# Patient Record
Sex: FEMALE | Race: WHITE | NOT HISPANIC OR LATINO | Employment: FULL TIME | URBAN - METROPOLITAN AREA
[De-identification: names, ages, dates, MRNs, and addresses within clinical notes are randomized per-mention and may not be internally consistent; named-entity substitution may affect disease eponyms.]

---

## 2017-02-09 ENCOUNTER — TELEPHONE (OUTPATIENT)
Dept: ORTHOPEDIC SURGERY | Facility: CLINIC | Age: 57
End: 2017-02-09

## 2017-03-02 NOTE — PROGRESS NOTES
Pre-Operative Orthopedic Assessment      Patient: Holly Bateman    YOB: 1960      Age/Gender: 56 y.o. female  Medical Record Number: [id number]  Surgical Procedure Planned: AK TOTAL KNEE ARTHROPLASTY [92913] (LT TOTAL KNEE ARTHROPLASTY)     Surgeon: Myron Easley MD    Date of Surgery Planned: 03/14/2017    Question Value Score    Patient Score   1. What is your age group? 50-65 years  66-75 years  >75 years =2  =1  =0 2   2. Gender? Male  Female =2  =1 1   3. How far on average can you walk? (a block is 200 meters) Two blocks or more (+\- rest)  1-2 blocks (+\- rest)  Housebound (most of time) =2  =1  =0 2   4. Which gait aid to you use? (more often than not) None  Single-Point Stick  Crutches/Frame =2  =1  =0 2   5. Do you use community  supports? (home help, meals on wheels, district nursing) None or one per week  Two or more per week =1  =0 1   6. Will you live with someone who can care for you after your operation? Yes  No =3  =0 0      Your Score (out of 12)  8     Key Destination at Discharge from acute care predicted by score   Scores < 6  -- extended inpatient rehabilitation   Scores 6-9 -- additional intervention to discharge directly home (Rehabilitation in home)   Scores > 9 -- directly home         Discharge Disposition/Planning:     Patient Response   Discussed the Predicted discharge disposition needed based on RAPT Assessment with the patient.    yes   Patient selected discharge disposition:   rehab   Out Patient Rehabilitation Facility of Choice:    none   Home Health Services Preferred:   none   Has the patient completed or been scheduled to complete pre-operative testing? yes   Post-Operative Care Giver Name and Phone Number:    Emergency contact mother  Sandee Bacon  [phone]     Subacute Inpatient Rehabilitation:  Complete this section only if planning inpatient services at a Subacute Facility     Patient Response   Subacute Facility Preferred (Please list  2 facilities:   Touring facilities   Requires pre-certification for inpatient rehabilitation services?      yes   Planned source of transportation to inpatient rehabilitation facility?   Family/friend    If choosing inpatient services at an Acute or Subacute Facility please list a subsequent back-up plan (in case patient fails to qualify for inpatient rehabilitation). Back-up plans should include caregiver (family member or friend) for first 24-48 post- -operatively.    Discussed need for backup of home with HH and caregiver   Home Equipment Patient Response   Does patient have a walker for home use?    yes   Does patient have a 3 in 1 commode for home use?    no   Does patient have a shower chair for home use?    yes   Does patient have an elevated commode seat for home use? Chair height   Does patient have a cane for home use?    yes   Is there any other medical equipment in the home? If so,  List in comment section below no   Pre-Operative Class Attendance Patient Response   Attended or scheduled to attend the pre-operative class within 1 year of total joint replacement? yes   Not planning to attend the pre-operative class    n/a   Has attended the pre-operative class > 1 year ago    n/a   Does not want to attend the class    n/a   Was misinformed that did not need to attend the class    n/a   Class time is not convenient    n/a   Other reasons for refusing to attend the pre-operative class (if yes, see comments below)   n/a   Patient Education  Completed   Expected time of discharge discussed yes   Encouraged to attend Pre-Operative Class    yes   Education re: Back-up plan for patients planning discharge to subacute facilities yes   Education re: Predicted Discharge Disposition based on RAPT score    yes   Patient receptive and voiced understanding of information given    yes                                                                                                            Comments:    Address verified.   Patient resides alone 2 steps to enter home. Patient is scheduled for PAT and class but has a conflict and will call to reschedule.  Patient wants rehab as she resides alone and family works.  Patient is looking for a facility that she wants that takes her [employer].  Patient aware that she needs backup plan in event that [employer] denies rehab stay.                                       Signature: Catrina Smith RN    Date:  3/2/2017

## 2017-03-08 ENCOUNTER — HOSPITAL ENCOUNTER (OUTPATIENT)
Dept: GENERAL RADIOLOGY | Facility: HOSPITAL | Age: 57
Discharge: HOME | End: 2017-03-08
Admitting: ORTHOPAEDIC SURGERY

## 2017-03-08 ENCOUNTER — HOSPITAL ENCOUNTER (OUTPATIENT)
Dept: GENERAL RADIOLOGY | Facility: HOSPITAL | Age: 57
Discharge: HOME | End: 2017-03-08

## 2017-03-08 ENCOUNTER — APPOINTMENT (OUTPATIENT)
Dept: PREADMISSION TESTING | Facility: HOSPITAL | Age: 57
End: 2017-03-08

## 2017-03-08 VITALS
BODY MASS INDEX: 29.88 KG/M2 | TEMPERATURE: 98.4 F | HEIGHT: 64 IN | WEIGHT: 175 LBS | SYSTOLIC BLOOD PRESSURE: 170 MMHG | OXYGEN SATURATION: 99 % | DIASTOLIC BLOOD PRESSURE: 115 MMHG | RESPIRATION RATE: 20 BRPM | HEART RATE: 76 BPM

## 2017-03-08 DIAGNOSIS — G89.29 CHRONIC PAIN OF LEFT KNEE: Primary | ICD-10-CM

## 2017-03-08 DIAGNOSIS — M25.562 CHRONIC PAIN OF LEFT KNEE: Primary | ICD-10-CM

## 2017-03-08 LAB
ABO GROUP BLD: NORMAL
ALBUMIN SERPL-MCNC: 4.3 G/DL (ref 3.5–5.2)
ALBUMIN/GLOB SERPL: 1.9 G/DL
ALP SERPL-CCNC: 49 U/L (ref 39–117)
ALT SERPL W P-5'-P-CCNC: 17 U/L (ref 1–33)
ANION GAP SERPL CALCULATED.3IONS-SCNC: 13.5 MMOL/L
AST SERPL-CCNC: 17 U/L (ref 1–32)
BILIRUB SERPL-MCNC: 0.4 MG/DL (ref 0.1–1.2)
BILIRUB UR QL STRIP: NEGATIVE
BLD GP AB SCN SERPL QL: NEGATIVE
BUN BLD-MCNC: 14 MG/DL (ref 6–20)
BUN/CREAT SERPL: 26.4 (ref 7–25)
CALCIUM SPEC-SCNC: 9.5 MG/DL (ref 8.6–10.5)
CHLORIDE SERPL-SCNC: 105 MMOL/L (ref 98–107)
CLARITY UR: CLEAR
CO2 SERPL-SCNC: 24.5 MMOL/L (ref 22–29)
COLOR UR: YELLOW
CREAT BLD-MCNC: 0.53 MG/DL (ref 0.57–1)
DEPRECATED RDW RBC AUTO: 42.3 FL (ref 37–54)
ERYTHROCYTE [DISTWIDTH] IN BLOOD BY AUTOMATED COUNT: 13 % (ref 11.7–13)
GFR SERPL CREATININE-BSD FRML MDRD: 120 ML/MIN/1.73
GLOBULIN UR ELPH-MCNC: 2.3 GM/DL
GLUCOSE BLD-MCNC: 94 MG/DL (ref 65–99)
GLUCOSE UR STRIP-MCNC: NEGATIVE MG/DL
HCT VFR BLD AUTO: 42.3 % (ref 35.6–45.5)
HGB BLD-MCNC: 13.9 G/DL (ref 11.9–15.5)
HGB UR QL STRIP.AUTO: NEGATIVE
INR PPP: 1.01 (ref 0.9–1.1)
KETONES UR QL STRIP: NEGATIVE
LEUKOCYTE ESTERASE UR QL STRIP.AUTO: NEGATIVE
MCH RBC QN AUTO: 29 PG (ref 26.9–32)
MCHC RBC AUTO-ENTMCNC: 32.9 G/DL (ref 32.4–36.3)
MCV RBC AUTO: 88.1 FL (ref 80.5–98.2)
NITRITE UR QL STRIP: NEGATIVE
PH UR STRIP.AUTO: 6 [PH] (ref 5–8)
PLATELET # BLD AUTO: 194 10*3/MM3 (ref 140–500)
PMV BLD AUTO: 10.9 FL (ref 6–12)
POTASSIUM BLD-SCNC: 3.6 MMOL/L (ref 3.5–5.2)
PROT SERPL-MCNC: 6.6 G/DL (ref 6–8.5)
PROT UR QL STRIP: NEGATIVE
PROTHROMBIN TIME: 12.9 SECONDS (ref 11.7–14.2)
RBC # BLD AUTO: 4.8 10*6/MM3 (ref 3.9–5.2)
RH BLD: NEGATIVE
SODIUM BLD-SCNC: 143 MMOL/L (ref 136–145)
SP GR UR STRIP: 1.01 (ref 1–1.03)
UROBILINOGEN UR QL STRIP: NORMAL
WBC NRBC COR # BLD: 5.71 10*3/MM3 (ref 4.5–10.7)

## 2017-03-08 PROCEDURE — 85610 PROTHROMBIN TIME: CPT | Performed by: ORTHOPAEDIC SURGERY

## 2017-03-08 PROCEDURE — 73560 X-RAY EXAM OF KNEE 1 OR 2: CPT

## 2017-03-08 PROCEDURE — G8978 MOBILITY CURRENT STATUS: HCPCS

## 2017-03-08 PROCEDURE — 81003 URINALYSIS AUTO W/O SCOPE: CPT | Performed by: ORTHOPAEDIC SURGERY

## 2017-03-08 PROCEDURE — 86850 RBC ANTIBODY SCREEN: CPT | Performed by: ORTHOPAEDIC SURGERY

## 2017-03-08 PROCEDURE — 36415 COLL VENOUS BLD VENIPUNCTURE: CPT

## 2017-03-08 PROCEDURE — 97161 PT EVAL LOW COMPLEX 20 MIN: CPT

## 2017-03-08 PROCEDURE — 93005 ELECTROCARDIOGRAM TRACING: CPT

## 2017-03-08 PROCEDURE — G8979 MOBILITY GOAL STATUS: HCPCS

## 2017-03-08 PROCEDURE — 85027 COMPLETE CBC AUTOMATED: CPT | Performed by: ORTHOPAEDIC SURGERY

## 2017-03-08 PROCEDURE — 93010 ELECTROCARDIOGRAM REPORT: CPT | Performed by: INTERNAL MEDICINE

## 2017-03-08 PROCEDURE — 80053 COMPREHEN METABOLIC PANEL: CPT | Performed by: ORTHOPAEDIC SURGERY

## 2017-03-08 PROCEDURE — 86901 BLOOD TYPING SEROLOGIC RH(D): CPT | Performed by: ORTHOPAEDIC SURGERY

## 2017-03-08 PROCEDURE — G8980 MOBILITY D/C STATUS: HCPCS

## 2017-03-08 PROCEDURE — 86900 BLOOD TYPING SEROLOGIC ABO: CPT | Performed by: ORTHOPAEDIC SURGERY

## 2017-03-08 PROCEDURE — 71020 HC CHEST PA AND LATERAL: CPT

## 2017-03-08 RX ORDER — CELECOXIB 200 MG/1
200 CAPSULE ORAL EVERY MORNING
COMMUNITY
End: 2019-01-28

## 2017-03-08 RX ORDER — ESTRADIOL 0.07 MG/D
1 FILM, EXTENDED RELEASE TRANSDERMAL 2 TIMES WEEKLY
COMMUNITY
End: 2019-01-28

## 2017-03-08 NOTE — DISCHARGE INSTRUCTIONS
Take the following medications the morning of surgery with a small sip of water.  NONE        General Instructions:  • Do not eat or drink after midnight: includes water, mints, or gum. You may brush your teeth.  • Do not smoke, chew tobacco, or drink alcohol.  • The Pre-Admission Testing nurse will instruct you to bring medications if unable to obtain an accurate list in Pre-Admission Testing.    • If applicable bring your C-PAP/ BI-PAP machine.  • Bring any papers given to you in the doctor’s office.  • Wear clean comfortable clothes and socks.  • Do not wear contact lenses or make-up.  Bring a case for your glasses if applicable.   • Bring crutches or walker if applicable.  • Leave all other valuables and jewelry at home.    If you were given a blood bank ID arm band remember to bring it with you the day of surgery.    Preventing a Surgical Site Infection:  Shower on the morning of surgery using a fresh bar of anti-bacterial soap (such as Dial) and clean washcloth.  Dry with a clean towel and dress in clean clothing.  For 2 to 3 days before surgery, avoid shaving with a razor near where you will have surgery because the razor can irritate skin and make it easier to develop an infection  Ask your surgeon if you will be receiving antibiotics prior to surgery  Make sure you, your family, and all healthcare providers clean their hands with soap and water or an alcohol based hand  before caring for you or your wound  If at all possible, quit smoking as many days before surgery as you can.    Day of surgery:  Upon arrival, a Pre-op nurse and Anesthesiologist will review your health history, obtain vital signs, and answer questions you may have.  The only belongings needed at this time will be your home medications and if applicable your C-PAP/BI-PAP machine.  If you are staying overnight your family can leave the rest of your belongings in the car and bring them to your room later.  A Pre-op nurse will start  an IV and you may receive medication in preparation for surgery, including something to help you relax.  Your family will be able to see you in the Pre-op area.  While you are in surgery your family should notify the waiting room  if they leave the waiting room area and provide a contact phone number.    Please be aware that surgery does come with discomfort.  We want to make every effort to control your discomfort so please discuss any uncontrolled symptoms with your nurse.   Your doctor will most likely have prescribed pain medications.      If you are going home after surgery you will receive individualized written care instructions before being discharged.  A responsible adult must drive you to and from the hospital on the day of your surgery and stay with you for 24 hours.    If you are staying overnight following surgery, you will be transported to your hospital room following the recovery period.  [facility] has all private rooms.    If you have any questions please call Pre-Admission Testing at [phone].  Deductibles and co-payments are collected on the day of service. Please be prepared to pay the required co-pay, deductible or deposit on the day of service as defined by your plan.    2% CHLORAHEXIDINE GLUCONATE* CLOTH  Preparing or “prepping” skin before surgery can reduce the risk of infection at the surgical site. To make the process easier, [facility] has chosen disposable cloths moistened with a rinse-free, 2% Chlorhexidine Gluconate (CHG) antiseptic solution. The steps below outline the prepping process and should be carefully followed.        Use the prep cloth on the area that is circled in the diagram             Directions Night before Surgery  1) Shower using a fresh bar of anti-bacterial soap (such as Dial) and clean washcloth.  Use a clean towel to completely dry your skin.  2) Do not use any lotions, oils or creams on your skin.  3) Open the package  and remove 1 cloth, wipe your skin for 30 seconds in a circular motion.  Allow to dry for 3 minutes.  4) Repeat #3 with second cloth.  5) Do not touch your eyes, ears, or mouth with the prep cloth.  6) Allow the wet prep solution to air dry.  7) Discard the prep cloth and wash your hands with soap and water.   8) Dress in clean bed clothes and sleep on fresh clean bed sheets.   9) You may experience some temporary itching after the prep.    Directions Day of Surgery  1) Repeat steps 1,2,3,4,5,6,7, and 9.   2) Dress in clean clothes before coming to the hospital.    BACTROBAN NASAL OINTMENT  There are many germs normally in your nose. Bactroban is an ointment that will help reduce these germs. Please follow these instructions for Bactroban use:    ____Two days before surgery in the evening Date________    ____The day before surgery in the morning  Date________    ____The day before surgery in the evening              Date________    ____The day of surgery in the morning    Date________    **Squirt ½ package of Bactroban Ointment onto a cotton applicator and apply to inside of 1st nostril.  Squirt the remaining Bactroban and apply to the inside of the other nostril.    PERIDEX- ORAL:  Use only if your surgeon has ordered  Use the night before and morning of surgery - Swish, gargle, and spit - do not swallow.

## 2017-03-08 NOTE — PROGRESS NOTES
Physical Therapy Outpatient Preoperative Total Joint Evaluation     Patient Name: Holly Bateman  : 1960  MRN: [id number]  Today's Date: 3/8/2017         Surgery Date: 17    There is no problem list on file for this patient.       Past Medical History   Diagnosis Date   • Arthritis    • Knee pain, bilateral    • Meniere disease    • Unspecified obstruction of eustachian tube, left ear         Past Surgical History   Procedure Laterality Date   • Knee surgery Bilateral      Torn meniscus   • Foot surgery Bilateral 2010     Plantar Fascitis   • Hysterectomy     • Tonsillectomy     • Tubal abdominal ligation     • Bilateral breast reduction     • Belpharoptosis repair     • Lift / repair brow ptosis forehead                TOTAL JOINT PREOP EVAL (last 72 hours)      Total Joint Preop Evaluation       17 0839                Preoperative Evaluation    Surgery TKR: Left  -TV        Surgery Date 17  -TV        Previous Total Joint No  -TV        Attended Class yes  -TV        Prior Activity Status    All Household independent  -TV        All Community independent  -TV        Gait independent  -TV        Transfers independent  -TV         none  -TV        DC Plans Home;Sub Acute   wants to go to [place]  -TV        Assist at home none  -TV        Home Environment 1 story;basement  -TV        Exterior steps none   3  -TV        Interior steps 1 rail   14  -TV        Pain 0-10    Pain Level 1  -TV        Location r knee  -TV        LE Measurements - ROM    Hip Flexion - Right AROM is WNL;Strength is grossly > or equal to 3/5  -TV        Hip Abduction - Right AROM is WNL;Strength is grossly > or equal to 3/5  -TV        Knee Flexion - Right Strength is grossly > or equal to 3/5   120  -TV        Knee Extension - Right Strength is grossly > or equal to 3/5   -15  -TV        Hip Flexion - Left AROM is WNL;Strength is grossly > or equal to 3/5  -TV        Hip Abduction - Left AROM is WNL;Strength  is grossly > or equal to 3/5  -TV        Knee Flexion - Left Strength is grossly > or equal to 3/5   120  -TV        Knee Extension - Left Strength is grossly > or equal to 3/5   -5  -TV        UE ROM/Strength    UE ROM/Strength adequate for walker use  -TV        Other Measurements    Sensation/Circulation intact  -TV        Gait Observation no device  -TV        Equipment    Equipment Patient Has Walker;Cane  -TV        ASSESSMENT    Goal Patient demonstrates good understanding of post-op P.T.;Exercises;Surgical Procedure  -TV        Goal Met? Yes  -TV        Anticipated Progress good  -TV        Patient agrees with Plan of Treatment? Yes  -TV        Plan Will see for post op TJR protocol  -TV          User Key  (r) = Recorded By, (t) = Taken By, (c) = Cosigned By    Initials Name Effective Dates    TV Gloria Weber, PT 01/07/16 -              Time Calculation:          Therapy Charges for Today     Code Description Service Date Service Provider Modifiers Qty    52846666203 HC PT MOBILITY CURRENT 3/8/2017 Gloria Weber, PT GP, CI 1    50723700027 HC PT MOBILITY PROJECTED 3/8/2017 Gloria Weber, PT GP, CI 1    31443454200 HC PT MOBILITY DISCHARGE 3/8/2017 Gloria Weber, PT GP, CI 1    [id number] HC PAT-TOTAL JOINT CLASS 3/8/2017 Gloria Weber, PT  1    79394622803 HC PT EVAL LOW COMPLEXITY 1 3/8/2017 Gloria Weber, PT GP 1            PT G-Codes  PT Professional Judgement Used?: Yes  Functional Limitation: Mobility: Walking and moving around  Mobility: Walking and Moving Around Current Status (): At least 1 percent but less than 20 percent impaired, limited or restricted  Mobility: Walking and Moving Around Goal Status (): At least 1 percent but less than 20 percent impaired, limited or restricted  Mobility: Walking and Moving Around Discharge Status (): At least 1 percent but less than 20 percent impaired, limited or restricted      Gloria Weber, PT  3/8/2017

## 2017-03-14 ENCOUNTER — ANESTHESIA (OUTPATIENT)
Dept: PERIOP | Facility: HOSPITAL | Age: 57
End: 2017-03-14

## 2017-03-14 ENCOUNTER — HOSPITAL ENCOUNTER (INPATIENT)
Facility: HOSPITAL | Age: 57
LOS: 3 days | Discharge: HOME HEALTH | End: 2017-03-17
Attending: ORTHOPAEDIC SURGERY | Admitting: ORTHOPAEDIC SURGERY

## 2017-03-14 ENCOUNTER — ANESTHESIA EVENT (OUTPATIENT)
Dept: PERIOP | Facility: HOSPITAL | Age: 57
End: 2017-03-14

## 2017-03-14 ENCOUNTER — APPOINTMENT (OUTPATIENT)
Dept: GENERAL RADIOLOGY | Facility: HOSPITAL | Age: 57
End: 2017-03-14

## 2017-03-14 DIAGNOSIS — R26.2 DIFFICULTY WALKING: Primary | ICD-10-CM

## 2017-03-14 PROBLEM — M17.9 OA (OSTEOARTHRITIS) OF KNEE: Status: ACTIVE | Noted: 2017-03-14

## 2017-03-14 PROCEDURE — 25010000002 FENTANYL CITRATE (PF) 100 MCG/2ML SOLUTION: Performed by: NURSE ANESTHETIST, CERTIFIED REGISTERED

## 2017-03-14 PROCEDURE — 25010000003 CEFAZOLIN IN DEXTROSE 2-4 GM/100ML-% SOLUTION: Performed by: ORTHOPAEDIC SURGERY

## 2017-03-14 PROCEDURE — 25010000002 NEOSTIGMINE 10 MG/10ML SOLUTION: Performed by: NURSE ANESTHETIST, CERTIFIED REGISTERED

## 2017-03-14 PROCEDURE — C1776 JOINT DEVICE (IMPLANTABLE): HCPCS | Performed by: ORTHOPAEDIC SURGERY

## 2017-03-14 PROCEDURE — 25010000002 ONDANSETRON PER 1 MG: Performed by: NURSE ANESTHETIST, CERTIFIED REGISTERED

## 2017-03-14 PROCEDURE — 94799 UNLISTED PULMONARY SVC/PX: CPT

## 2017-03-14 PROCEDURE — 25010000002 HYDROMORPHONE PER 4 MG: Performed by: NURSE ANESTHETIST, CERTIFIED REGISTERED

## 2017-03-14 PROCEDURE — C1713 ANCHOR/SCREW BN/BN,TIS/BN: HCPCS | Performed by: ORTHOPAEDIC SURGERY

## 2017-03-14 PROCEDURE — 25010000002 KETOROLAC TROMETHAMINE PER 15 MG: Performed by: ORTHOPAEDIC SURGERY

## 2017-03-14 PROCEDURE — 25010000002 MORPHINE (PF) 10 MG/ML SOLUTION 1 ML VIAL: Performed by: ORTHOPAEDIC SURGERY

## 2017-03-14 PROCEDURE — 73560 X-RAY EXAM OF KNEE 1 OR 2: CPT

## 2017-03-14 PROCEDURE — 25010000002 MIDAZOLAM PER 1 MG: Performed by: ANESTHESIOLOGY

## 2017-03-14 PROCEDURE — 25010000002 PROPOFOL 10 MG/ML EMULSION: Performed by: NURSE ANESTHETIST, CERTIFIED REGISTERED

## 2017-03-14 PROCEDURE — 0SRD0J9 REPLACEMENT OF LEFT KNEE JOINT WITH SYNTHETIC SUBSTITUTE, CEMENTED, OPEN APPROACH: ICD-10-PCS | Performed by: ORTHOPAEDIC SURGERY

## 2017-03-14 PROCEDURE — 25010000002 EPINEPHRINE PER 0.1 MG: Performed by: ORTHOPAEDIC SURGERY

## 2017-03-14 PROCEDURE — 25010000002 ROPIVACAINE PER 1 MG: Performed by: ANESTHESIOLOGY

## 2017-03-14 PROCEDURE — 25010000002 FENTANYL CITRATE (PF) 100 MCG/2ML SOLUTION: Performed by: ANESTHESIOLOGY

## 2017-03-14 PROCEDURE — 25010000002 ROPIVACAINE PER 1 MG: Performed by: ORTHOPAEDIC SURGERY

## 2017-03-14 DEVICE — CAP TOTL KN CMT PREMIUM: Type: IMPLANTABLE DEVICE | Site: KNEE | Status: FUNCTIONAL

## 2017-03-14 DEVICE — IMPLANTABLE DEVICE
Type: IMPLANTABLE DEVICE | Site: KNEE | Status: FUNCTIONAL
Brand: VANGUARD® KNEE SYSTEM

## 2017-03-14 DEVICE — PAT 3PEG THN 28X6.2 28MM: Type: IMPLANTABLE DEVICE | Site: KNEE | Status: FUNCTIONAL

## 2017-03-14 DEVICE — IMPLANTABLE DEVICE
Type: IMPLANTABLE DEVICE | Site: KNEE | Status: FUNCTIONAL
Brand: BIOMET KNEE SYSTEM

## 2017-03-14 DEVICE — CMT BONE PALACOS 120001: Type: IMPLANTABLE DEVICE | Status: FUNCTIONAL

## 2017-03-14 RX ORDER — HYDROCODONE BITARTRATE AND ACETAMINOPHEN 5; 325 MG/1; MG/1
1 TABLET ORAL ONCE AS NEEDED
Status: DISCONTINUED | OUTPATIENT
Start: 2017-03-14 | End: 2017-03-14 | Stop reason: HOSPADM

## 2017-03-14 RX ORDER — MIDAZOLAM HYDROCHLORIDE 1 MG/ML
2 INJECTION INTRAMUSCULAR; INTRAVENOUS
Status: DISCONTINUED | OUTPATIENT
Start: 2017-03-14 | End: 2017-03-14 | Stop reason: HOSPADM

## 2017-03-14 RX ORDER — ONDANSETRON 2 MG/ML
4 INJECTION INTRAMUSCULAR; INTRAVENOUS EVERY 6 HOURS PRN
Status: DISCONTINUED | OUTPATIENT
Start: 2017-03-14 | End: 2017-03-17 | Stop reason: HOSPADM

## 2017-03-14 RX ORDER — ACETAMINOPHEN 325 MG/1
325 TABLET ORAL EVERY 4 HOURS PRN
Status: DISCONTINUED | OUTPATIENT
Start: 2017-03-14 | End: 2017-03-17 | Stop reason: HOSPADM

## 2017-03-14 RX ORDER — SODIUM CHLORIDE, SODIUM LACTATE, POTASSIUM CHLORIDE, CALCIUM CHLORIDE 600; 310; 30; 20 MG/100ML; MG/100ML; MG/100ML; MG/100ML
9 INJECTION, SOLUTION INTRAVENOUS CONTINUOUS
Status: DISCONTINUED | OUTPATIENT
Start: 2017-03-14 | End: 2017-03-17 | Stop reason: HOSPADM

## 2017-03-14 RX ORDER — PROMETHAZINE HYDROCHLORIDE 25 MG/ML
12.5 INJECTION, SOLUTION INTRAMUSCULAR; INTRAVENOUS ONCE AS NEEDED
Status: DISCONTINUED | OUTPATIENT
Start: 2017-03-14 | End: 2017-03-14 | Stop reason: HOSPADM

## 2017-03-14 RX ORDER — PROMETHAZINE HYDROCHLORIDE 25 MG/1
25 SUPPOSITORY RECTAL ONCE AS NEEDED
Status: DISCONTINUED | OUTPATIENT
Start: 2017-03-14 | End: 2017-03-14 | Stop reason: HOSPADM

## 2017-03-14 RX ORDER — ACETAMINOPHEN 325 MG/1
650 TABLET ORAL EVERY 4 HOURS PRN
Status: DISCONTINUED | OUTPATIENT
Start: 2017-03-14 | End: 2017-03-17 | Stop reason: HOSPADM

## 2017-03-14 RX ORDER — SODIUM CHLORIDE 450 MG/100ML
100 INJECTION, SOLUTION INTRAVENOUS CONTINUOUS
Status: DISCONTINUED | OUTPATIENT
Start: 2017-03-14 | End: 2017-03-17 | Stop reason: HOSPADM

## 2017-03-14 RX ORDER — PROMETHAZINE HYDROCHLORIDE 25 MG/1
25 TABLET ORAL ONCE AS NEEDED
Status: DISCONTINUED | OUTPATIENT
Start: 2017-03-14 | End: 2017-03-14 | Stop reason: HOSPADM

## 2017-03-14 RX ORDER — UREA 10 %
1 LOTION (ML) TOPICAL NIGHTLY PRN
Status: DISCONTINUED | OUTPATIENT
Start: 2017-03-14 | End: 2017-03-17 | Stop reason: HOSPADM

## 2017-03-14 RX ORDER — ACETAMINOPHEN 500 MG
1000 TABLET ORAL ONCE
Status: COMPLETED | OUTPATIENT
Start: 2017-03-14 | End: 2017-03-14

## 2017-03-14 RX ORDER — OXYCODONE HYDROCHLORIDE AND ACETAMINOPHEN 5; 325 MG/1; MG/1
1 TABLET ORAL EVERY 4 HOURS PRN
Status: DISCONTINUED | OUTPATIENT
Start: 2017-03-14 | End: 2017-03-16

## 2017-03-14 RX ORDER — KETOROLAC TROMETHAMINE 30 MG/ML
15 INJECTION, SOLUTION INTRAMUSCULAR; INTRAVENOUS EVERY 8 HOURS PRN
Status: DISPENSED | OUTPATIENT
Start: 2017-03-14 | End: 2017-03-16

## 2017-03-14 RX ORDER — CELECOXIB 200 MG/1
200 CAPSULE ORAL DAILY
Status: DISCONTINUED | OUTPATIENT
Start: 2017-03-15 | End: 2017-03-17 | Stop reason: HOSPADM

## 2017-03-14 RX ORDER — CLINDAMYCIN PHOSPHATE 900 MG/50ML
900 INJECTION INTRAVENOUS EVERY 8 HOURS
Status: COMPLETED | OUTPATIENT
Start: 2017-03-14 | End: 2017-03-15

## 2017-03-14 RX ORDER — ONDANSETRON 2 MG/ML
4 INJECTION INTRAMUSCULAR; INTRAVENOUS ONCE AS NEEDED
Status: DISCONTINUED | OUTPATIENT
Start: 2017-03-14 | End: 2017-03-14 | Stop reason: HOSPADM

## 2017-03-14 RX ORDER — PROPOFOL 10 MG/ML
VIAL (ML) INTRAVENOUS AS NEEDED
Status: DISCONTINUED | OUTPATIENT
Start: 2017-03-14 | End: 2017-03-14 | Stop reason: SURG

## 2017-03-14 RX ORDER — BISACODYL 10 MG
10 SUPPOSITORY, RECTAL RECTAL DAILY PRN
Status: DISCONTINUED | OUTPATIENT
Start: 2017-03-14 | End: 2017-03-17 | Stop reason: HOSPADM

## 2017-03-14 RX ORDER — PROMETHAZINE HYDROCHLORIDE 25 MG/ML
12.5 INJECTION, SOLUTION INTRAMUSCULAR; INTRAVENOUS EVERY 6 HOURS PRN
Status: DISCONTINUED | OUTPATIENT
Start: 2017-03-14 | End: 2017-03-17 | Stop reason: HOSPADM

## 2017-03-14 RX ORDER — FERROUS SULFATE 325(65) MG
325 TABLET ORAL
Status: DISCONTINUED | OUTPATIENT
Start: 2017-03-15 | End: 2017-03-17 | Stop reason: HOSPADM

## 2017-03-14 RX ORDER — ROCURONIUM BROMIDE 10 MG/ML
INJECTION, SOLUTION INTRAVENOUS AS NEEDED
Status: DISCONTINUED | OUTPATIENT
Start: 2017-03-14 | End: 2017-03-14 | Stop reason: SURG

## 2017-03-14 RX ORDER — LABETALOL HYDROCHLORIDE 5 MG/ML
10 INJECTION, SOLUTION INTRAVENOUS ONCE
Status: COMPLETED | OUTPATIENT
Start: 2017-03-14 | End: 2017-03-14

## 2017-03-14 RX ORDER — FENTANYL CITRATE 50 UG/ML
50 INJECTION, SOLUTION INTRAMUSCULAR; INTRAVENOUS
Status: DISCONTINUED | OUTPATIENT
Start: 2017-03-14 | End: 2017-03-14 | Stop reason: HOSPADM

## 2017-03-14 RX ORDER — NEOSTIGMINE METHYLSULFATE 1 MG/ML
INJECTION, SOLUTION INTRAVENOUS AS NEEDED
Status: DISCONTINUED | OUTPATIENT
Start: 2017-03-14 | End: 2017-03-14 | Stop reason: SURG

## 2017-03-14 RX ORDER — GLYCOPYRROLATE 0.2 MG/ML
INJECTION INTRAMUSCULAR; INTRAVENOUS AS NEEDED
Status: DISCONTINUED | OUTPATIENT
Start: 2017-03-14 | End: 2017-03-14 | Stop reason: SURG

## 2017-03-14 RX ORDER — ONDANSETRON 2 MG/ML
INJECTION INTRAMUSCULAR; INTRAVENOUS AS NEEDED
Status: DISCONTINUED | OUTPATIENT
Start: 2017-03-14 | End: 2017-03-14 | Stop reason: SURG

## 2017-03-14 RX ORDER — OXYCODONE HYDROCHLORIDE AND ACETAMINOPHEN 5; 325 MG/1; MG/1
2 TABLET ORAL EVERY 4 HOURS PRN
Status: DISCONTINUED | OUTPATIENT
Start: 2017-03-14 | End: 2017-03-16

## 2017-03-14 RX ORDER — HYDRALAZINE HYDROCHLORIDE 20 MG/ML
5 INJECTION INTRAMUSCULAR; INTRAVENOUS
Status: DISCONTINUED | OUTPATIENT
Start: 2017-03-14 | End: 2017-03-14 | Stop reason: HOSPADM

## 2017-03-14 RX ORDER — ESTRADIOL 0.07 MG/D
1 FILM, EXTENDED RELEASE TRANSDERMAL 2 TIMES WEEKLY
Status: DISCONTINUED | OUTPATIENT
Start: 2017-03-16 | End: 2017-03-17 | Stop reason: HOSPADM

## 2017-03-14 RX ORDER — DIPHENHYDRAMINE HCL 25 MG
50 CAPSULE ORAL EVERY 6 HOURS PRN
Status: DISCONTINUED | OUTPATIENT
Start: 2017-03-14 | End: 2017-03-17 | Stop reason: HOSPADM

## 2017-03-14 RX ORDER — HYDROMORPHONE HYDROCHLORIDE 1 MG/ML
0.5 INJECTION, SOLUTION INTRAMUSCULAR; INTRAVENOUS; SUBCUTANEOUS
Status: DISCONTINUED | OUTPATIENT
Start: 2017-03-14 | End: 2017-03-14 | Stop reason: HOSPADM

## 2017-03-14 RX ORDER — IPRATROPIUM BROMIDE AND ALBUTEROL SULFATE 2.5; .5 MG/3ML; MG/3ML
3 SOLUTION RESPIRATORY (INHALATION) ONCE AS NEEDED
Status: DISCONTINUED | OUTPATIENT
Start: 2017-03-14 | End: 2017-03-14 | Stop reason: HOSPADM

## 2017-03-14 RX ORDER — CEFAZOLIN SODIUM 2 G/100ML
2 INJECTION, SOLUTION INTRAVENOUS ONCE
Status: COMPLETED | OUTPATIENT
Start: 2017-03-14 | End: 2017-03-14

## 2017-03-14 RX ORDER — DOCUSATE SODIUM 100 MG/1
100 CAPSULE, LIQUID FILLED ORAL 2 TIMES DAILY PRN
Status: DISCONTINUED | OUTPATIENT
Start: 2017-03-14 | End: 2017-03-17 | Stop reason: HOSPADM

## 2017-03-14 RX ORDER — FENTANYL CITRATE 50 UG/ML
INJECTION, SOLUTION INTRAMUSCULAR; INTRAVENOUS AS NEEDED
Status: DISCONTINUED | OUTPATIENT
Start: 2017-03-14 | End: 2017-03-14 | Stop reason: SURG

## 2017-03-14 RX ORDER — ONDANSETRON 4 MG/1
4 TABLET, FILM COATED ORAL EVERY 6 HOURS PRN
Status: DISCONTINUED | OUTPATIENT
Start: 2017-03-14 | End: 2017-03-17 | Stop reason: HOSPADM

## 2017-03-14 RX ORDER — SODIUM CHLORIDE, SODIUM LACTATE, POTASSIUM CHLORIDE, CALCIUM CHLORIDE 600; 310; 30; 20 MG/100ML; MG/100ML; MG/100ML; MG/100ML
100 INJECTION, SOLUTION INTRAVENOUS CONTINUOUS
Status: DISCONTINUED | OUTPATIENT
Start: 2017-03-14 | End: 2017-03-17 | Stop reason: HOSPADM

## 2017-03-14 RX ORDER — MIDAZOLAM HYDROCHLORIDE 1 MG/ML
1 INJECTION INTRAMUSCULAR; INTRAVENOUS
Status: DISCONTINUED | OUTPATIENT
Start: 2017-03-14 | End: 2017-03-14 | Stop reason: HOSPADM

## 2017-03-14 RX ORDER — MORPHINE SULFATE 2 MG/ML
6 INJECTION, SOLUTION INTRAMUSCULAR; INTRAVENOUS
Status: DISCONTINUED | OUTPATIENT
Start: 2017-03-14 | End: 2017-03-17 | Stop reason: HOSPADM

## 2017-03-14 RX ORDER — ROPIVACAINE HYDROCHLORIDE 5 MG/ML
INJECTION, SOLUTION EPIDURAL; INFILTRATION; PERINEURAL AS NEEDED
Status: DISCONTINUED | OUTPATIENT
Start: 2017-03-14 | End: 2017-03-14 | Stop reason: SURG

## 2017-03-14 RX ORDER — LABETALOL HYDROCHLORIDE 5 MG/ML
5 INJECTION, SOLUTION INTRAVENOUS
Status: DISCONTINUED | OUTPATIENT
Start: 2017-03-14 | End: 2017-03-14 | Stop reason: HOSPADM

## 2017-03-14 RX ORDER — LIDOCAINE HYDROCHLORIDE 20 MG/ML
INJECTION, SOLUTION INFILTRATION; PERINEURAL AS NEEDED
Status: DISCONTINUED | OUTPATIENT
Start: 2017-03-14 | End: 2017-03-14 | Stop reason: SURG

## 2017-03-14 RX ORDER — ONDANSETRON 4 MG/1
4 TABLET, ORALLY DISINTEGRATING ORAL EVERY 6 HOURS PRN
Status: DISCONTINUED | OUTPATIENT
Start: 2017-03-14 | End: 2017-03-17 | Stop reason: HOSPADM

## 2017-03-14 RX ORDER — NALOXONE HCL 0.4 MG/ML
0.4 VIAL (ML) INJECTION
Status: DISCONTINUED | OUTPATIENT
Start: 2017-03-14 | End: 2017-03-17 | Stop reason: HOSPADM

## 2017-03-14 RX ORDER — CELECOXIB 200 MG/1
200 CAPSULE ORAL ONCE
Status: COMPLETED | OUTPATIENT
Start: 2017-03-14 | End: 2017-03-14

## 2017-03-14 RX ORDER — SENNA AND DOCUSATE SODIUM 50; 8.6 MG/1; MG/1
2 TABLET, FILM COATED ORAL 2 TIMES DAILY
Status: DISCONTINUED | OUTPATIENT
Start: 2017-03-14 | End: 2017-03-17 | Stop reason: HOSPADM

## 2017-03-14 RX ORDER — DIAZEPAM 5 MG/1
5 TABLET ORAL 2 TIMES DAILY PRN
Status: DISCONTINUED | OUTPATIENT
Start: 2017-03-14 | End: 2017-03-17 | Stop reason: HOSPADM

## 2017-03-14 RX ORDER — SODIUM CHLORIDE 0.9 % (FLUSH) 0.9 %
1-10 SYRINGE (ML) INJECTION AS NEEDED
Status: DISCONTINUED | OUTPATIENT
Start: 2017-03-14 | End: 2017-03-17 | Stop reason: HOSPADM

## 2017-03-14 RX ORDER — ASPIRIN 325 MG
325 TABLET, DELAYED RELEASE (ENTERIC COATED) ORAL DAILY
Status: DISCONTINUED | OUTPATIENT
Start: 2017-03-15 | End: 2017-03-17 | Stop reason: HOSPADM

## 2017-03-14 RX ORDER — OLOPATADINE HYDROCHLORIDE 1 MG/ML
1 SOLUTION/ DROPS OPHTHALMIC EVERY MORNING
Status: DISCONTINUED | OUTPATIENT
Start: 2017-03-15 | End: 2017-03-15 | Stop reason: CLARIF

## 2017-03-14 RX ORDER — FAMOTIDINE 10 MG/ML
20 INJECTION, SOLUTION INTRAVENOUS ONCE
Status: COMPLETED | OUTPATIENT
Start: 2017-03-14 | End: 2017-03-14

## 2017-03-14 RX ORDER — DIAZEPAM 5 MG/ML
5 INJECTION, SOLUTION INTRAMUSCULAR; INTRAVENOUS 2 TIMES DAILY PRN
Status: ACTIVE | OUTPATIENT
Start: 2017-03-14 | End: 2017-03-15

## 2017-03-14 RX ORDER — SODIUM CHLORIDE 0.9 % (FLUSH) 0.9 %
1-10 SYRINGE (ML) INJECTION AS NEEDED
Status: DISCONTINUED | OUTPATIENT
Start: 2017-03-14 | End: 2017-03-14 | Stop reason: HOSPADM

## 2017-03-14 RX ORDER — OXYCODONE HYDROCHLORIDE AND ACETAMINOPHEN 5; 325 MG/1; MG/1
2 TABLET ORAL EVERY 4 HOURS PRN
Status: DISCONTINUED | OUTPATIENT
Start: 2017-03-14 | End: 2017-03-14

## 2017-03-14 RX ADMIN — SODIUM CHLORIDE 100 ML/HR: 4.5 INJECTION, SOLUTION INTRAVENOUS at 21:38

## 2017-03-14 RX ADMIN — PROPOFOL 200 MG: 10 INJECTION, EMULSION INTRAVENOUS at 15:12

## 2017-03-14 RX ADMIN — LABETALOL HYDROCHLORIDE 10 MG: 5 INJECTION, SOLUTION INTRAVENOUS at 14:34

## 2017-03-14 RX ADMIN — LIDOCAINE HYDROCHLORIDE 60 MG: 20 INJECTION, SOLUTION INFILTRATION; PERINEURAL at 15:12

## 2017-03-14 RX ADMIN — HYDROMORPHONE HYDROCHLORIDE 0.25 MG: 1 INJECTION, SOLUTION INTRAMUSCULAR; INTRAVENOUS; SUBCUTANEOUS at 17:55

## 2017-03-14 RX ADMIN — NEOSTIGMINE METHYLSULFATE 4 MG: 1 INJECTION INTRAVENOUS at 16:52

## 2017-03-14 RX ADMIN — CEFAZOLIN SODIUM 2 G: 2 INJECTION, SOLUTION INTRAVENOUS at 16:38

## 2017-03-14 RX ADMIN — FENTANYL CITRATE 50 MCG: 50 INJECTION INTRAMUSCULAR; INTRAVENOUS at 13:57

## 2017-03-14 RX ADMIN — ROCURONIUM BROMIDE 50 MG: 10 INJECTION INTRAVENOUS at 15:12

## 2017-03-14 RX ADMIN — SODIUM CHLORIDE, POTASSIUM CHLORIDE, SODIUM LACTATE AND CALCIUM CHLORIDE: 600; 310; 30; 20 INJECTION, SOLUTION INTRAVENOUS at 15:46

## 2017-03-14 RX ADMIN — HYDROMORPHONE HYDROCHLORIDE 0.5 MG: 1 INJECTION, SOLUTION INTRAMUSCULAR; INTRAVENOUS; SUBCUTANEOUS at 17:28

## 2017-03-14 RX ADMIN — FENTANYL CITRATE 50 MCG: 50 INJECTION INTRAMUSCULAR; INTRAVENOUS at 17:45

## 2017-03-14 RX ADMIN — CLINDAMYCIN PHOSPHATE 900 MG: 18 INJECTION, SOLUTION INTRAMUSCULAR; INTRAVENOUS at 20:19

## 2017-03-14 RX ADMIN — ONDANSETRON 4 MG: 2 INJECTION INTRAMUSCULAR; INTRAVENOUS at 16:52

## 2017-03-14 RX ADMIN — OXYCODONE HYDROCHLORIDE AND ACETAMINOPHEN 1 TABLET: 5; 325 TABLET ORAL at 22:14

## 2017-03-14 RX ADMIN — MIDAZOLAM 1 MG: 1 INJECTION INTRAMUSCULAR; INTRAVENOUS at 13:34

## 2017-03-14 RX ADMIN — FENTANYL CITRATE 50 MCG: 50 INJECTION, SOLUTION INTRAMUSCULAR; INTRAVENOUS at 16:00

## 2017-03-14 RX ADMIN — LABETALOL HYDROCHLORIDE 10 MG: 5 INJECTION, SOLUTION INTRAVENOUS at 14:18

## 2017-03-14 RX ADMIN — HYDROMORPHONE HYDROCHLORIDE 0.25 MG: 1 INJECTION, SOLUTION INTRAMUSCULAR; INTRAVENOUS; SUBCUTANEOUS at 17:45

## 2017-03-14 RX ADMIN — FENTANYL CITRATE 100 MCG: 50 INJECTION, SOLUTION INTRAMUSCULAR; INTRAVENOUS at 15:12

## 2017-03-14 RX ADMIN — FENTANYL CITRATE 50 MCG: 50 INJECTION INTRAMUSCULAR; INTRAVENOUS at 17:26

## 2017-03-14 RX ADMIN — ROPIVACAINE HYDROCHLORIDE 30 ML: 5 INJECTION, SOLUTION EPIDURAL; INFILTRATION; PERINEURAL at 14:00

## 2017-03-14 RX ADMIN — SODIUM CHLORIDE, POTASSIUM CHLORIDE, SODIUM LACTATE AND CALCIUM CHLORIDE 9 ML/HR: 600; 310; 30; 20 INJECTION, SOLUTION INTRAVENOUS at 13:33

## 2017-03-14 RX ADMIN — FENTANYL CITRATE 50 MCG: 50 INJECTION INTRAMUSCULAR; INTRAVENOUS at 13:58

## 2017-03-14 RX ADMIN — MIDAZOLAM 2 MG: 1 INJECTION INTRAMUSCULAR; INTRAVENOUS at 13:57

## 2017-03-14 RX ADMIN — MUPIROCIN: 20 OINTMENT TOPICAL at 21:36

## 2017-03-14 RX ADMIN — CELECOXIB 200 MG: 200 CAPSULE ORAL at 12:50

## 2017-03-14 RX ADMIN — DOCUSATE SODIUM,SENNOSIDES 2 TABLET: 50; 8.6 TABLET, FILM COATED ORAL at 21:36

## 2017-03-14 RX ADMIN — FENTANYL CITRATE 50 MCG: 50 INJECTION, SOLUTION INTRAMUSCULAR; INTRAVENOUS at 17:00

## 2017-03-14 RX ADMIN — GLYCOPYRROLATE 0.6 MG: 0.2 INJECTION INTRAMUSCULAR; INTRAVENOUS at 16:52

## 2017-03-14 RX ADMIN — FAMOTIDINE 20 MG: 10 INJECTION, SOLUTION INTRAVENOUS at 13:33

## 2017-03-14 RX ADMIN — FENTANYL CITRATE 50 MCG: 50 INJECTION, SOLUTION INTRAMUSCULAR; INTRAVENOUS at 15:44

## 2017-03-14 RX ADMIN — CEFAZOLIN SODIUM 2 G: 2 INJECTION, SOLUTION INTRAVENOUS at 15:02

## 2017-03-14 RX ADMIN — ACETAMINOPHEN 1000 MG: 500 TABLET ORAL at 12:50

## 2017-03-14 NOTE — PLAN OF CARE
Problem: Patient Care Overview (Adult)  Goal: Plan of Care Review  Outcome: Ongoing (interventions implemented as appropriate)    03/14/17 1221   Coping/Psychosocial Response Interventions   Plan Of Care Reviewed With patient   Patient Care Overview   Progress no change       Goal: Adult Individualization and Mutuality  Outcome: Ongoing (interventions implemented as appropriate)    03/14/17 1221   Individualization   Patient Specific Preferences none       Goal: Discharge Needs Assessment  Outcome: Ongoing (interventions implemented as appropriate)    03/14/17 1221   Discharge Needs Assessment   Concerns To Be Addressed no discharge needs identified         Problem: Perioperative Period (Adult)  Goal: Signs and Symptoms of Listed Potential Problems Will be Absent or Manageable (Perioperative Period)  Outcome: Ongoing (interventions implemented as appropriate)    03/14/17 1221   Perioperative Period   Problems Assessed (Perioperative Period) all   Problems Present (Perioperative Period) pain;physiologic stress response

## 2017-03-14 NOTE — PLAN OF CARE
Problem: Patient Care Overview (Adult)  Goal: Plan of Care Review  Outcome: Ongoing (interventions implemented as appropriate)    03/14/17 1259   Coping/Psychosocial Response Interventions   Plan Of Care Reviewed With patient       Goal: Adult Individualization and Mutuality  Outcome: Ongoing (interventions implemented as appropriate)    03/14/17 1259   Individualization   Patient Specific Preferences pt goes by patience       Goal: Discharge Needs Assessment  Outcome: Ongoing (interventions implemented as appropriate)    Problem: Perioperative Period (Adult)  Goal: Signs and Symptoms of Listed Potential Problems Will be Absent or Manageable (Perioperative Period)  Outcome: Ongoing (interventions implemented as appropriate)    03/14/17 1259   Perioperative Period   Problems Assessed (Perioperative Period) pain   Problems Present (Perioperative Period) pain

## 2017-03-14 NOTE — ANESTHESIA PREPROCEDURE EVALUATION
Anesthesia Evaluation     Patient summary reviewed and Nursing notes reviewed   NPO Status: > 8 hours   Airway   Mallampati: II  Neck ROM: full  no difficulty expected  Dental - normal exam     Pulmonary - negative pulmonary ROS    breath sounds clear to auscultation  Cardiovascular - negative cardio ROS    Rhythm: regular        Neuro/Psych- negative ROS  GI/Hepatic/Renal/Endo - negative ROS     Musculoskeletal     Abdominal    Substance History - negative use     OB/GYN negative ob/gyn ROS         Other   (+) arthritis                                 Anesthesia Plan    ASA 2     general   (Adductor canal)  intravenous induction   Anesthetic plan and risks discussed with patient.

## 2017-03-14 NOTE — ANESTHESIA POSTPROCEDURE EVALUATION
Patient: Holly Bateman    Procedure Summary     Date Anesthesia Start Anesthesia Stop Room / Location    03/14/17 1502 1718  [facility] / [facility]       Procedure Diagnosis Surgeon Provider    LT TOTAL KNEE ARTHROPLASTY (Left Knee) No diagnosis on file. MD Jerome Bowie MD          Anesthesia Type: general  Last vitals  /79 (03/14/17 1745)    Temp      Pulse 75 (03/14/17 1745)   Resp 20 (03/14/17 1745)    SpO2 97 % (03/14/17 1745)      Post Anesthesia Care and Evaluation    Patient location during evaluation: PACU  Patient participation: complete - patient participated  Level of consciousness: awake and alert  Pain management: adequate  Airway patency: patent  Anesthetic complications: No anesthetic complications  PONV Status: none  Cardiovascular status: acceptable  Respiratory status: acceptable  Hydration status: acceptable

## 2017-03-14 NOTE — OP NOTE
Operative Note    Patient Name:  Holly Bateman  YOB: 1960  Medical Records Number:  [id number]    Date of Procedure:  3/14/2017    Pre-operative Diagnosis:  Primary Osteoarthritis Left Knee    Post-operative Diagnosis:  Primary Osteoarthritis Left Knee    Procedure Performed:  Left Total Knee Arthroplasty    Implants:  Biomet Vanguard TKA, 62.5 Femoral Component, 67 Tibial Tray with a 40 mm Modular Stem, 28 3-Peg Patella, 12 std.  Polyethylene Insert    Surgeon:  Myron Easley M.D.    Assistant: Holly Yu (who was present during the critical portions of the case, thereby decreasing operative time and patient morbidity)    Anesthetic Type:  General    Estimated Blood Loss:  250cc's  No Complications      Indications for Procedure:  Holly Bateman is a 56 y.o. female suffering from end stage degenerative changes in the left knee.  The patients pain is becoming disabling, despite extensive conservative care, including NSAIDS, therapy and injections.  The patient wishes to undergo left total knee arthroplasty.  The risks, benefits and alternatives were discussed and the patient wishes to proceed with total knee arthroplasty.      Procedure Performed:    After informed consent was obtained and pre-operative IV Kefzol given, prior to tourniquet inflation, the patient was taken to the operating room and placed supine on the operating table.  After general anesthesia induced, the patient's left lower extremity was prepped with chloraprep and draped in a sterile fashion.    A midline incision was made overlying the left knee and we sharply dissected down to expose the parapatellar retinaculum.  A mid-vastus, muscle sparing, parapatellar arthrotomy was performed and we elevated the soft tissue both medially and laterally.  The menisci and ACL were excised.  We everted the patella and measured this to be 21 mm and we removed 6 mm of bone down to 15mm.  We measured the patella to be 28 and drilled our three  "drill holes.  We protected the patella with the patella protector and turned our attention to the femur and the tibia.    We drilled drill holes into the femoral and the tibial intramedullary canals and proceeded to irrigate the canals to remove the fatty marrow.  We used the intramedullary priti and the 5 degree valgus cut block to make our distal femoral cut.  Once we had a smooth surface, we measured the femur to be 62.5.  We assured we had rotational alignment using the epicondylar axis, then we used the four-in-one cut block to make our anterior, posterior, anterior and posterior chamfer cuts.  We placed our femoral trial, had excellent fit, and extended the knee and marked Pima's line on the tibia.      We then turned our attention to the tibia, where we irrigated the canal again.  We used the intramedullary priti and the 3 degree posterior sloped cut block to remove 2 mm of bone from the effected side of the tibia.  Prior to making our cut, we assured we had rotational alignment using the external guide and Pravin's line.  Once we had a smooth surface, we measured the tibia to be 67.  We then removed soft tissue and bony debris from the posterior aspect of the knee.    We placed our trial implants and had excellent fit, range of motion, stability and ligament balance, throughout a complete arc of motion with a 12 std. polyethylene liner.  We removed our trial implants, punched the tibial keel, copiously irrigated the knee, then cemented our implants in place using palacos cement.  Once the cement cured, we trialed again with the 10, 12 and 14 standard and posterior lipped polyethylene liners.  The 12 std. gave us the best range of motion, stability and ligament balance, throughout a complete arc of motion.  We removed the trials, copiously irrigated the knee, gave IV antibiotics, placed our Mychal and local anesthetic, then placed our permanent polyethylene liner.  We placed a 1/8\" hemovac drain, then " closed the arthrotomy with #1 Vicryl pop-off sutures.  The subcutaneous tissue was closed with 2-0 vicryl pop-off sutures.  The skin was closed with staples.  We placed a sterile dressing of Xeroform, 4x4's, abdominal pads, cast padding and an Ace Wrap.  The patient was then awakened from general anesthesia and taken to the recovery room in stable condition.    The patient will be started on Aspirin 325mg twice daily for DVT prophylaxis.  IV antibiotics will be discontinued within 24 hours of surgery.  Immediately prior to surgery, there were no acute Thromboembolic nor Cardiovascular risk factors.  An updated Medical Reconciliation form is on the chart.

## 2017-03-14 NOTE — H&P
History and Physical    Patient Name:  Holly Bateman  YOB: 1960    Medical Records Number:  [id number]    Date of Admission:  No admission date for patient encounter.    Chief Complaint:  LT TOTAL KNEE ARTHROPLASTY    Holly Bateman is a 56 y.o. female who presents c/o severe left knee pain.  The pain has been on and off for many years, worsening to the point where the pain is becoming disabling.  The pain is a constant dull ache with occasional sharp, stabbing pain.  The patient has failed conservative treatment and would like to proceed with total knee arthroplasty.    There were no vitals taken for this visit.    Past Medical History:    Past Medical History   Diagnosis Date   • Arthritis    • Knee pain, bilateral    • Meniere disease    • Unspecified obstruction of eustachian tube, left ear        Social History:    Social History     Social History   • Marital status: Single     Spouse name: N/A   • Number of children: N/A   • Years of education: N/A     Occupational History   • Not on file.     Social History Main Topics   • Smoking status: Never Smoker   • Smokeless tobacco: Never Used   • Alcohol use No   • Drug use: No   • Sexual activity: Not on file     Other Topics Concern   • Not on file     Social History Narrative       Family History:    Family History   Problem Relation Age of Onset   • Colon cancer Mother    • Lung cancer Father        Current Medications:    Current Facility-Administered Medications:   •  ropivacaine (NAROPIN) 50 mL, Morphine (PF) 5 mg, ketorolac (TORADOL) 30 mg, EPINEPHrine (ADRENALIN) 0.3 mg in sodium chloride 0.9 % 101.8 mL, , Injection, Once, Myron Easley MD    Current Outpatient Prescriptions:   •  celecoxib (CeleBREX) 200 MG capsule, Take 200 mg by mouth Every Morning., Disp: , Rfl:   •  Chlorhexidine Gluconate 2 % pads, Apply 1 application topically 2 (Two) Times a Day., Disp: , Rfl:   •  estradiol (MINIVELLE, VIVELLE-DOT) 0.075 MG/24HR patch, Place 1 patch  on the skin 2 (Two) Times a Week., Disp: , Rfl:   •  mupirocin (BACTROBAN) 2 % ointment, Apply 1 application topically 3 (Three) Times a Day. PRE OP, Disp: , Rfl:   •  olopatadine (PATADAY) 0.2 % solution ophthalmic solution, 1 drop Every Morning., Disp: , Rfl:     Allergies:  No Known Allergies    Review of Systems:   HEENT: Patient denies any headaches, vision changes, change in hearing, or tinnitus, Patient denies any rhinorrhea,epistaxis, sinus pain, mouth or dental problems, sore throat or hoarseness, or dysphagia  Pulmonary: Patient denies any cough, congestion, SOA, or wheezing  Cardiovascular: Patient denies any chest pain, dyspnea, palpitations, weakness, intolerance of exercise, varicosities, swelling of extremities, known murmur  Gastrointestinal:  Patient denies nausea, vomiting, diarrhea, constipation, loss  of appetite, change in appetite, dysphagia, gas, heartburn, melena, change in bowel habits, use of laxatives or other drugs to alter the function of the gastrointestinal tract.  Genital/Urinary: Patient denies dysuria, change in color of urine, change in frequency of urination, pain with urgency, incontinence, retention, or nocturia.  Musculoskeletal: Patient denies increased warmth; redness; or swelling of joints; limitation of function; deformity; crepitation: pain in a joint or an extremity, the neck, or the back, especially with movement.  Neurological: Patient denies dizziness, tremor, ataxia, difficulty in speaking, change in speech, paresthesia, loss of sensation, seizures, syncope, changes in memory.  Endocrine system: Patient denies tremors, palpitations, intolerance of heat or cold, polyuria, polydipsia, polyphagia, diaphoresis, exophthalmos, or goiter.  Psychological: Patient denies thoughts/plans or harming self or other; depression,  insomnia, night terrors, long, memory loss, disorientation.  Skin: Patient denies any bruising, rashes, discoloration, pruritus, wounds, ulcers, decubiti,  changes in the hair or nails  Hematopoietic: Patient denies history of spontaneous or excessive bleeding, epistaxis, hematuria, melena, fatigue, enlarged or tender lymph nodes, pallor, history of anemia.        Physical Exam:   Awake, A&O x3, affect normal, no acute distress  Ambulating with a limp due to knee pain  Knee ROM is limited due to pain (5-115)  Strength is 4/5 in the quad, hamstring and calf  Cap refill is normal, Sensation intact    Card:  RR, HD Stable  Pulm:  Regular breathing, no S.O.A  Abd:  Soft, NT, ND    Lab Results (last 24 hours)     ** No results found for the last 24 hours. **          Xr Knee 1 Or 2 View Left    Result Date: 3/8/2017  Narrative: PA AND LATERAL RADIOGRAPHIC VIEWS OF THE CHEST AND 2 RADIOGRAPHIC VIEWS OF THE LEFT KNEE  CLINICAL HISTORY: Preop left total knee arthroplasty. Left knee pain. Occasional shortness of air.  FINDINGS: Two radiographic views of the left knee demonstrate tricompartmental osteoarthritic changes within the left knee. There is joint space narrowing, subchondral sclerosis, and marginal osteophyte formation. However, no superimposed acute abnormality is noted.  PA and lateral radiographic views of the chest demonstrate clear lungs. Cardiomediastinal silhouette is within normal limits. Osseous structures are remarkable for degenerative phenomena within the thoracic spine.      Impression: No active disease in the chest.  Tricompartmental osteoarthritic changes within the knee without evidence to suggest acute fracture or bony malalignment.  This report was finalized on 3/8/2017 6:47 PM by Dr. Kaiser Sal MD.      Xr Chest Pa & Lateral    Result Date: 3/8/2017  Narrative: PA AND LATERAL RADIOGRAPHIC VIEWS OF THE CHEST AND 2 RADIOGRAPHIC VIEWS OF THE LEFT KNEE  CLINICAL HISTORY: Preop left total knee arthroplasty. Left knee pain. Occasional shortness of air.  FINDINGS: Two radiographic views of the left knee demonstrate tricompartmental osteoarthritic changes  within the left knee. There is joint space narrowing, subchondral sclerosis, and marginal osteophyte formation. However, no superimposed acute abnormality is noted.  PA and lateral radiographic views of the chest demonstrate clear lungs. Cardiomediastinal silhouette is within normal limits. Osseous structures are remarkable for degenerative phenomena within the thoracic spine.      Impression: No active disease in the chest.  Tricompartmental osteoarthritic changes within the knee without evidence to suggest acute fracture or bony malalignment.  This report was finalized on 3/8/2017 6:47 PM by Dr. Kaiser Sal MD.          Assessment:  End-stage Primary left Knee Osteoarthritis    Plan:  Patient's pain is becoming disabling, despite extensive conservative treatment.  Radiographs reveal end-stage degenerative changes.  The risks of surgery, including, but not limited to, heart attack, stroke, dying, DVT, arthrofibrosis and infection were discussed.  The alternatives and benefits were also discussed.  All questions answered and the patient wishes to proceed with left total knee arthroplasty.

## 2017-03-14 NOTE — ANESTHESIA PROCEDURE NOTES
Peripheral Block    Patient location during procedure: holding area  Start time: 3/14/2017 1:55 PM  Stop time: 3/14/2017 2:02 PM  Reason for block: at surgeon's request and post-op pain management  Performed by  Anesthesiologist: JOSE LUIS GALO  Preanesthetic Checklist  Completed: patient identified, site marked, surgical consent, pre-op evaluation, timeout performed, IV checked, risks and benefits discussed and monitors and equipment checked  Peripheral Block Prep:  Sterile barriers:gloves and cap  Prep: ChloraPrep  Patient monitoring: blood pressure monitoring, continuous pulse oximetry and EKG  Peripheral Procedure  Sedation:yes  Guidance:ultrasound guided  Images:still images obtained    Laterality:left  Block Type:adductor canal block  Injection Technique:single-shot  Needle Type:echogenic  Needle Gauge:21 G  ULTRASOUND INTERPRETATION.  Using ultrasound guidance a 22 G gauge needle was placed in close proximity to the femoral nerve, at which point, under ultrasound guidance anesthetic was injected in the area of the nerve and spread of the anesthesia was seen on ultrasound in close proximity thereto.  There were no abnormalities seen on ultrasound; a digital image was taken; and the patient tolerated the procedure with no complications.   Medications  Local Injected:ropivacaine 0.5% without epinephrine Local Amount Injected:30mL  Post Assessment  Injection Assessment: incremental injection, no paresthesia on injection and negative aspiration for heme  Patient Tolerance:comfortable throughout block  Complications:no

## 2017-03-14 NOTE — ANESTHESIA PROCEDURE NOTES
Airway  Urgency: elective    Airway not difficult    General Information and Staff    Patient location during procedure: OR  Anesthesiologist: GINO HAGER  CRNA: TODD HUNT    Indications and Patient Condition  Indications for airway management: airway protection    Preoxygenated: yes  MILS not maintained throughout  Mask difficulty assessment: 1 - vent by mask    Final Airway Details  Final airway type: endotracheal airway      Successful airway: ETT  Cuffed: yes   Successful intubation technique: direct laryngoscopy  Facilitating devices/methods: intubating stylet  Endotracheal tube insertion site: oral  Blade: Rajat  Blade size: #3  ETT size: 7.0 mm  Cormack-Lehane Classification: grade I - full view of glottis  Placement verified by: chest auscultation   Cuff volume (mL): 8  Measured from: lips  ETT to lips (cm): 21  Number of attempts at approach: 1    Additional Comments  PreO2 100% face mask, IV induction, easy mask, DVL x1, cords noted, tube through, cuff up, EBBSH, +etCO2, = chest movement, tube secured in place, atraumatic, teeth and lips intact as preop.

## 2017-03-15 LAB
ANION GAP SERPL CALCULATED.3IONS-SCNC: 14.6 MMOL/L
BUN BLD-MCNC: 7 MG/DL (ref 6–20)
BUN/CREAT SERPL: 17.5 (ref 7–25)
CALCIUM SPEC-SCNC: 8.6 MG/DL (ref 8.6–10.5)
CHLORIDE SERPL-SCNC: 99 MMOL/L (ref 98–107)
CO2 SERPL-SCNC: 23.4 MMOL/L (ref 22–29)
CREAT BLD-MCNC: 0.4 MG/DL (ref 0.57–1)
GFR SERPL CREATININE-BSD FRML MDRD: >150 ML/MIN/1.73
GLUCOSE BLD-MCNC: 146 MG/DL (ref 65–99)
HCT VFR BLD AUTO: 36.5 % (ref 35.6–45.5)
HGB BLD-MCNC: 12.4 G/DL (ref 11.9–15.5)
POTASSIUM BLD-SCNC: 3.4 MMOL/L (ref 3.5–5.2)
SODIUM BLD-SCNC: 137 MMOL/L (ref 136–145)

## 2017-03-15 PROCEDURE — 85014 HEMATOCRIT: CPT | Performed by: ORTHOPAEDIC SURGERY

## 2017-03-15 PROCEDURE — 80048 BASIC METABOLIC PNL TOTAL CA: CPT | Performed by: ORTHOPAEDIC SURGERY

## 2017-03-15 PROCEDURE — 97110 THERAPEUTIC EXERCISES: CPT

## 2017-03-15 PROCEDURE — 25010000002 ONDANSETRON PER 1 MG: Performed by: ORTHOPAEDIC SURGERY

## 2017-03-15 PROCEDURE — 97162 PT EVAL MOD COMPLEX 30 MIN: CPT | Performed by: PHYSICAL THERAPIST

## 2017-03-15 PROCEDURE — 25010000002 KETOROLAC TROMETHAMINE PER 15 MG: Performed by: ORTHOPAEDIC SURGERY

## 2017-03-15 PROCEDURE — 97110 THERAPEUTIC EXERCISES: CPT | Performed by: PHYSICAL THERAPIST

## 2017-03-15 PROCEDURE — 85018 HEMOGLOBIN: CPT | Performed by: ORTHOPAEDIC SURGERY

## 2017-03-15 PROCEDURE — 94799 UNLISTED PULMONARY SVC/PX: CPT

## 2017-03-15 PROCEDURE — 97150 GROUP THERAPEUTIC PROCEDURES: CPT

## 2017-03-15 RX ORDER — KETOTIFEN FUMARATE 0.35 MG/ML
1 SOLUTION/ DROPS OPHTHALMIC EVERY MORNING
Status: DISCONTINUED | OUTPATIENT
Start: 2017-03-15 | End: 2017-03-17 | Stop reason: HOSPADM

## 2017-03-15 RX ADMIN — OXYCODONE HYDROCHLORIDE AND ACETAMINOPHEN 2 TABLET: 5; 325 TABLET ORAL at 11:23

## 2017-03-15 RX ADMIN — CELECOXIB 200 MG: 200 CAPSULE ORAL at 10:05

## 2017-03-15 RX ADMIN — OXYCODONE HYDROCHLORIDE AND ACETAMINOPHEN 2 TABLET: 5; 325 TABLET ORAL at 02:31

## 2017-03-15 RX ADMIN — DOCUSATE SODIUM,SENNOSIDES 2 TABLET: 50; 8.6 TABLET, FILM COATED ORAL at 17:42

## 2017-03-15 RX ADMIN — DIAZEPAM 5 MG: 5 TABLET ORAL at 03:40

## 2017-03-15 RX ADMIN — MAGNESIUM HYDROXIDE 10 ML: 2400 SUSPENSION ORAL at 23:47

## 2017-03-15 RX ADMIN — ONDANSETRON 4 MG: 2 INJECTION INTRAMUSCULAR; INTRAVENOUS at 03:24

## 2017-03-15 RX ADMIN — OXYCODONE HYDROCHLORIDE AND ACETAMINOPHEN 2 TABLET: 5; 325 TABLET ORAL at 15:33

## 2017-03-15 RX ADMIN — DOCUSATE SODIUM,SENNOSIDES 2 TABLET: 50; 8.6 TABLET, FILM COATED ORAL at 10:05

## 2017-03-15 RX ADMIN — MUPIROCIN: 20 OINTMENT TOPICAL at 17:42

## 2017-03-15 RX ADMIN — OXYCODONE HYDROCHLORIDE AND ACETAMINOPHEN 2 TABLET: 5; 325 TABLET ORAL at 19:37

## 2017-03-15 RX ADMIN — DOCUSATE SODIUM 100 MG: 100 CAPSULE, LIQUID FILLED ORAL at 06:47

## 2017-03-15 RX ADMIN — OXYCODONE HYDROCHLORIDE AND ACETAMINOPHEN 2 TABLET: 5; 325 TABLET ORAL at 23:47

## 2017-03-15 RX ADMIN — DOCUSATE SODIUM 100 MG: 100 CAPSULE, LIQUID FILLED ORAL at 15:38

## 2017-03-15 RX ADMIN — CLINDAMYCIN PHOSPHATE 900 MG: 18 INJECTION, SOLUTION INTRAMUSCULAR; INTRAVENOUS at 03:40

## 2017-03-15 RX ADMIN — KETOTIFEN FUMARATE 1 DROP: 0.25 SOLUTION/ DROPS OPHTHALMIC at 15:38

## 2017-03-15 RX ADMIN — OXYCODONE HYDROCHLORIDE AND ACETAMINOPHEN 2 TABLET: 5; 325 TABLET ORAL at 06:47

## 2017-03-15 RX ADMIN — FERROUS SULFATE TAB 325 MG (65 MG ELEMENTAL FE) 325 MG: 325 (65 FE) TAB at 10:05

## 2017-03-15 RX ADMIN — ASPIRIN 325 MG: 325 TABLET, DELAYED RELEASE ORAL at 10:04

## 2017-03-15 RX ADMIN — KETOROLAC TROMETHAMINE 15 MG: 30 INJECTION, SOLUTION INTRAMUSCULAR at 12:21

## 2017-03-15 RX ADMIN — KETOROLAC TROMETHAMINE 15 MG: 30 INJECTION, SOLUTION INTRAMUSCULAR at 03:34

## 2017-03-15 NOTE — PLAN OF CARE
Problem: Patient Care Overview (Adult)  Goal: Plan of Care Review  Outcome: Ongoing (interventions implemented as appropriate)    03/15/17 0859 03/15/17 6499   Coping/Psychosocial Response Interventions   Plan Of Care Reviewed With --  patient   Patient Care Overview   Progress --  improving   Outcome Evaluation   Outcome Summary/Follow up Plan Pt presents with pain and limited ROM post-op L TKA. PT would benefit from PT to address strengthening, ROM and gait training. --        Goal: Adult Individualization and Mutuality  Outcome: Ongoing (interventions implemented as appropriate)  Goal: Discharge Needs Assessment  Outcome: Ongoing (interventions implemented as appropriate)    Problem: Perioperative Period (Adult)  Goal: Signs and Symptoms of Listed Potential Problems Will be Absent or Manageable (Perioperative Period)  Outcome: Ongoing (interventions implemented as appropriate)  Goal: Signs and Symptoms of Listed Potential Problems Will be Absent or Manageable (Perioperative Period)  Outcome: Ongoing (interventions implemented as appropriate)    Problem: Knee Replacement, Total (Adult)  Goal: Signs and Symptoms of Listed Potential Problems Will be Absent or Manageable (Knee Replacement, Total)  Outcome: Ongoing (interventions implemented as appropriate)    Problem: Fall Risk (Adult)  Goal: Absence of Falls  Outcome: Ongoing (interventions implemented as appropriate)

## 2017-03-15 NOTE — PROGRESS NOTES
"Ortho POD 1    Patients Name:  Holly Bateman  YOB: 1960  Medical Records Number:  [id number]    No complaints    Visit Vitals   • /84 (BP Location: Left arm, Patient Position: Lying)   • Pulse 94   • Temp 98.2 °F (36.8 °C) (Oral)   • Resp 16   • Ht 64\" (162.6 cm)   • Wt 172 lb 2 oz (78.1 kg)   • SpO2 99%   • BMI 29.55 kg/m2       LLE:  NVI, calf nontender, Sensation intact  No signs of DVT    Incision: clean, no infection    Lab Results (last 24 hours)     Procedure Component Value Units Date/Time    Hemoglobin & Hematocrit, Blood [[id number]]  (Normal) Collected:  03/15/17 0404    Specimen:  Blood Updated:  03/15/17 0501     Hemoglobin 12.4 g/dL      Hematocrit 36.5 %     Basic Metabolic Panel [[id number]]  (Abnormal) Collected:  03/15/17 0404    Specimen:  Blood Updated:  03/15/17 0520     Glucose 146 (H) mg/dL      BUN 7 mg/dL      Creatinine 0.40 (L) mg/dL      Sodium 137 mmol/L      Potassium 3.4 (L) mmol/L      Chloride 99 mmol/L      CO2 23.4 mmol/L      Calcium 8.6 mg/dL      eGFR Non African Amer >150 mL/min/1.73      BUN/Creatinine Ratio 17.5      Anion Gap 14.6 mmol/L     Narrative:       GFR Normal >60  Chronic Kidney Disease <60  Kidney Failure <15          Xr Knee 1 Or 2 View Left    Result Date: 3/14/2017  Narrative: XR KNEE 1 OR 2 VW LEFT-  INDICATIONS: Postoperative evaluation.  TECHNIQUE:     Frontal and lateral views of the left knee  COMPARISON: None available  FINDINGS:   Intact appearing knee arthroplasty hardware is seen with adjacent surgical soft tissue gas, drain, overlying skin staples. No acute fracture is identified.       Impression:  Postsurgical changes.    This report was finalized on 3/14/2017 5:48 PM by Dr. Jerome Leong MD.      Xr Knee 1 Or 2 View Left    Result Date: 3/8/2017  Narrative: PA AND LATERAL RADIOGRAPHIC VIEWS OF THE CHEST AND 2 RADIOGRAPHIC VIEWS OF THE LEFT KNEE  CLINICAL HISTORY: Preop left total knee arthroplasty. Left knee pain. Occasional " shortness of air.  FINDINGS: Two radiographic views of the left knee demonstrate tricompartmental osteoarthritic changes within the left knee. There is joint space narrowing, subchondral sclerosis, and marginal osteophyte formation. However, no superimposed acute abnormality is noted.  PA and lateral radiographic views of the chest demonstrate clear lungs. Cardiomediastinal silhouette is within normal limits. Osseous structures are remarkable for degenerative phenomena within the thoracic spine.      Impression: No active disease in the chest.  Tricompartmental osteoarthritic changes within the knee without evidence to suggest acute fracture or bony malalignment.  This report was finalized on 3/8/2017 6:47 PM by Dr. Kaiser Sal MD.      Xr Chest Pa & Lateral    Result Date: 3/8/2017  Narrative: PA AND LATERAL RADIOGRAPHIC VIEWS OF THE CHEST AND 2 RADIOGRAPHIC VIEWS OF THE LEFT KNEE  CLINICAL HISTORY: Preop left total knee arthroplasty. Left knee pain. Occasional shortness of air.  FINDINGS: Two radiographic views of the left knee demonstrate tricompartmental osteoarthritic changes within the left knee. There is joint space narrowing, subchondral sclerosis, and marginal osteophyte formation. However, no superimposed acute abnormality is noted.  PA and lateral radiographic views of the chest demonstrate clear lungs. Cardiomediastinal silhouette is within normal limits. Osseous structures are remarkable for degenerative phenomena within the thoracic spine.      Impression: No active disease in the chest.  Tricompartmental osteoarthritic changes within the knee without evidence to suggest acute fracture or bony malalignment.  This report was finalized on 3/8/2017 6:47 PM by Dr. Kaiser Sal MD.        S/p Left TKA  WBAT with walker  ASA for DVT prophylaxis

## 2017-03-15 NOTE — PROGRESS NOTES
Acute Care - Physical Therapy Treatment Note  [facility]     Patient Name: Holly Bateman  : 1960  MRN: [id number]  Today's Date: 3/15/2017             Admit Date: 3/14/2017    Visit Dx:    ICD-10-CM ICD-9-CM   1. Difficulty walking R26.2 719.7     Patient Active Problem List   Diagnosis   • OA (osteoarthritis) of knee               Adult Rehabilitation Note       03/15/17 1600          Rehab Assessment/Intervention    Discipline physical therapy assistant  -      Document Type therapy note (daily note)  -      Subjective Information agree to therapy;complains of;pain  -JM      Symptoms Noted During/After Treatment increased pain  -JM      Precautions/Limitations fall precautions  -      Recorded by [] Mikaela Coyle PTA      Pain Assessment    Pain Assessment 0-10  -      Pain Score 10  -JM      Post Pain Score 6  -JM      Pain Type Surgical pain  -      Pain Location Knee  -      Pain Orientation Left  -      Pain Intervention(s) Medication (See MAR);Repositioned;Ambulation/increased activity;Cold applied  -      Response to Interventions marissa  -JM      Recorded by [] Mikaela Coyle PTA      ROM (Range of Motion)    General ROM Detail -  -      Recorded by [JM] Mikaela Coyle PTA      Bed Mobility, Assessment/Treatment    Bed Mobility, Comment in chair  -      Recorded by [] Mikaela Coyle PTA      Transfer Assessment/Treatment    Transfers, Sit-Stand [place] stand by assist;verbal cues required  -      Transfers, Stand-Sit [place] stand by assist;verbal cues required  -      Transfers, Sit-Stand-Sit, Assist Device rolling walker  -      Recorded by [] Mikaela Coyle PTA      Gait Assessment/Treatment    Gait, [place] Level contact guard assist;verbal cues required  -      Gait, Assistive Device rolling walker  -      Gait, Distance (Feet) 65  -      Gait, Gait Deviations antalgic;zenobia decreased;forward flexed posture;step length  decreased  -JM      Gait, Safety Issues step length decreased  -JM      Gait, Impairments pain  -JM      Recorded by [STEVEN] Mikaela Coyle PTA      Therapy Exercises    Exercise Protocols total knee  -JM      Total Knee Exercises left:;15 reps;completed protocol   perf most exer bilat due to rt le also needs sx  -JM      Recorded by [STEVEN] Mikaela Coyle PTA      Positioning and Restraints    Pre-Treatment Position sitting in chair/recliner  -JM      In Chair reclined;with family/caregiver;encouraged to call for assist  -JM      Recorded by [STEVEN] Mikaela Coyle PTA        User Key  (r) = Recorded By, (t) = Taken By, (c) = Cosigned By    Initials Name Effective Dates    STEVEN Coyle PTA 02/18/16 -                 IP PT Goals       03/15/17 0859          Bed Mobility PT LTG    Bed Mobility PT LTG, Date Established 03/15/17  -KH      Bed Mobility PT LTG, Time to Achieve 3 days  -KH      Bed Mobility PT LTG, Activity Type all bed mobility  -KH      Bed Mobility PT LTG, [place] Level independent  -KH      Transfer Training PT LTG    Transfer Training PT LTG, Date Established 03/15/17  -KH      Transfer Training PT LTG, Time to Achieve 3 days  -KH      Transfer Training PT LTG, Activity Type all transfers  -KH      Transfer Training PT LTG, [place] Level supervision required  -KH      Transfer Training PT LTG, Assist Device walker, rolling  -KH      Gait Training PT LTG    Gait Training Goal PT LTG, Date Established 03/15/17  -KH      Gait Training Goal PT LTG, Time to Achieve 3 days  -KH      Gait Training Goal PT LTG, [place] Level supervision required  -KH      Gait Training Goal PT LTG, Assist Device walker, rolling  -KH      Gait Training Goal PT LTG, Distance to Achieve 75 ft  -KH      Stair Training PT LTG    Stair Training Goal PT LTG, Date Established 03/15/17  -KH      Stair Training Goal PT LTG, Time to Achieve 3 days  -KH      Stair Training Goal PT LTG, Number of Steps 3  -KH      Stair  Training Goal PT LTG, [place] Level contact guard assist  -      Stair Training Goal PT LTG, Assist Device 1 handrail  -KH      Range of Motion PT LTG    Range of Motion Goal PT LTG, Date Established 03/15/17  -      Range of Motion Goal PT LTG, Time to Achieve 3 days  -KH      Range fo Motion Goal PT LTG, Joint L knee  -KH      Range of Motion Goal PT LTG, AROM Measure 5-85  -KH      Patient Education PT LTG    Patient Education PT LTG, Date Established 03/15/17  -      Patient Education PT LTG, Time to Achieve 3 days  -KH      Patient Education PT LTG, Education Type HEP  -      Patient Education PT LTG, Education Understanding demonstrate adequately  -        User Key  (r) = Recorded By, (t) = Taken By, (c) = Cosigned By    Initials Name Provider Type    CORRINA Bingham, PT Physical Therapist          Physical Therapy Education     Title: PT OT SLP Therapies (Active)     Topic: Physical Therapy (Active)     Point: Mobility training (Done)    Learning Progress Summary    Learner Readiness Method Response Comment Documented by Status   Patient Acceptance E [facility] 03/15/17 0858 Done               Point: Home exercise program (Done)    Learning Progress Summary    Learner Readiness Method Response Comment Documented by Status   Patient Acceptance E [facility] 03/15/17 0858 Done                      User Key     Initials Effective Dates Name Provider Type Discipline     05/18/15 -  Jazmin Bingham, PT Physical Therapist PT                    PT Recommendation and Plan  Anticipated Discharge Disposition: home with home health, skilled nursing facility  Planned Therapy Interventions: bed mobility training, gait training, home exercise program, patient/family education, ROM (Range of Motion), strengthening, stair training, transfer training  PT Frequency: 2 times/day             Outcome Measures       03/15/17 0900          How much help from another person do you currently need...     Turning from your back to your side while in flat bed without using bedrails? 4  -KH      Moving from lying on back to sitting on the side of a flat bed without bedrails? 4  -KH      Moving to and from a bed to a chair (including a wheelchair)? 3  -KH      Standing up from a chair using your arms (e.g., wheelchair, bedside chair)? 3  -KH      Climbing 3-5 steps with a railing? 3  -KH      To walk in hospital room? 3  -KH      AM-PAC 6 Clicks Score 20  -KH      Functional Assessment    Outcome Measure Options AM-PAC 6 Clicks Basic Mobility (PT)  -        User Key  (r) = Recorded By, (t) = Taken By, (c) = Cosigned By    Initials Name Provider Type    CORRINA Bingham, PT Physical Therapist           Time Calculation:         PT Charges       03/15/17 1655 03/15/17 0901       Time Calculation    Start Time 1309  - 0853  -     Stop Time 1400  - 0910  -     Time Calculation (min) 51 min  - 17 min  -     PT Received On 03/15/17  - 03/15/17  -CORRINA     PT - Next Appointment 03/16/17  -STEVEN 03/15/17  -CORRINA     PT Goal Re-Cert Due Date  03/18/17  -CORRINA       User Key  (r) = Recorded By, (t) = Taken By, (c) = Cosigned By    Initials Name Provider Type    CORRINA Bingham, PT Physical Therapist    STEVEN Coyle PTA Physical Therapy Assistant          Therapy Charges for Today     Code Description Service Date Service Provider Modifiers Qty    16153606122 HC PT THER PROC EA 15 MIN 3/15/2017 Mikaela Coyle PTA GP 1    87112511941 HC PT THER PROC GROUP 3/15/2017 Mikaela Coyle PTA GP 1          PT G-Codes  Outcome Measure Options: AM-PAC 6 Clicks Basic Mobility (PT)    Mikaela Coyle PTA  3/15/2017

## 2017-03-15 NOTE — PLAN OF CARE
Problem: Patient Care Overview (Adult)  Goal: Plan of Care Review    03/15/17 0859   Outcome Evaluation   Outcome Summary/Follow up Plan Pt presents with pain and limited ROM post-op L TKA. PT would benefit from PT to address strengthening, ROM and gait training.         Problem: Inpatient Physical Therapy  Goal: Bed Mobility Goal LTG- PT    03/15/17 0859   Bed Mobility PT LTG   Bed Mobility PT LTG, Date Established 03/15/17   Bed Mobility PT LTG, Time to Achieve 3 days   Bed Mobility PT LTG, Activity Type all bed mobility   Bed Mobility PT LTG, [place] Level independent       Goal: Transfer Training Goal 1 LTG- PT    03/15/17 0859   Transfer Training PT LTG   Transfer Training PT LTG, Date Established 03/15/17   Transfer Training PT LTG, Time to Achieve 3 days   Transfer Training PT LTG, Activity Type all transfers   Transfer Training PT LTG, [place] Level supervision required   Transfer Training PT LTG, Assist Device walker, rolling       Goal: Gait Training Goal LTG- PT    03/15/17 0859   Gait Training PT LTG   Gait Training Goal PT LTG, Date Established 03/15/17   Gait Training Goal PT LTG, Time to Achieve 3 days   Gait Training Goal PT LTG, [place] Level supervision required   Gait Training Goal PT LTG, Assist Device walker, rolling   Gait Training Goal PT LTG, Distance to Achieve 75 ft       Goal: Stair Training Goal LTG- PT    03/15/17 0859   Stair Training PT LTG   Stair Training Goal PT LTG, Date Established 03/15/17   Stair Training Goal PT LTG, Time to Achieve 3 days   Stair Training Goal PT LTG, Number of Steps 3   Stair Training Goal PT LTG, [place] Level contact guard assist   Stair Training Goal PT LTG, Assist Device 1 handrail       Goal: Range of Motion Goal LTG- PT    03/15/17 0859   Range of Motion PT LTG   Range of Motion Goal PT LTG, Date Established 03/15/17   Range of Motion Goal PT LTG, Time to Achieve 3 days   Range fo Motion Goal PT LTG, Joint L knee   Range of Motion  Goal PT LTG, AROM Measure 5-85       Goal: Patient Education Goal LTG- PT    03/15/17 0859   Patient Education PT LTG   Patient Education PT LTG, Date Established 03/15/17   Patient Education PT LTG, Time to Achieve 3 days   Patient Education PT LTG, Education Type HEP   Patient Education PT LTG, Education Understanding demonstrate adequately

## 2017-03-15 NOTE — PROGRESS NOTES
Discharge Planning Assessment  [facility]     Patient Name: Holly Bateman  MRN: [id number]  Today's Date: 3/15/2017    Admit Date: 3/14/2017          Discharge Needs Assessment       03/15/17 1414    Living Environment    Lives With alone    Living Arrangements house    Discharge Needs Assessment    Concerns To Be Addressed basic needs concerns    Readmission Within The Last 30 Days no previous admission in last 30 days    Anticipated Changes Related to Illness inability to care for self    Equipment Currently Used at Home none    Equipment Needed After Discharge walker, standard    Discharge Facility/Level Of Care Needs nursing facility, skilled    Transportation Available car;family or friend will provide            Discharge Plan       03/15/17 1414    Case Management/Social Work Plan    Plan [facility] skilled    Patient/Family In Agreement With Plan yes    Additional Comments Spoke with pt and family, verified correct information on facesheet and explained the role of CCP. Pt would like to d/c to [facility], referral given to Delores/Arminda with [facility] who states they are able to accept, pending precert Plan will be to d/c to SNF skilled. CCP to follow.        Discharge Placement     Facility/Agency Request Status Selected? Address Phone Number Fax Number    [facility] Pending - precert     [address] [facility] [id number] [phone]        Gudelia Hale RN

## 2017-03-15 NOTE — PLAN OF CARE
Problem: Patient Care Overview (Adult)  Goal: Plan of Care Review  Outcome: Ongoing (interventions implemented as appropriate)    03/15/17 0132   Coping/Psychosocial Response Interventions   Plan Of Care Reviewed With patient   Patient Care Overview   Progress improving   Outcome Evaluation   Outcome Summary/Follow up Plan VSS, N/V STATUS WNL, DSG C/D/I, HEMOVAC PATENT, REPORTS FAIR PAIN CONTROL, UNABLE TO AMBULATE D/T FOOT NUMBNESS,        Goal: Adult Individualization and Mutuality  Outcome: Ongoing (interventions implemented as appropriate)  Goal: Discharge Needs Assessment  Outcome: Ongoing (interventions implemented as appropriate)    Problem: Perioperative Period (Adult)  Goal: Signs and Symptoms of Listed Potential Problems Will be Absent or Manageable (Perioperative Period)  Outcome: Ongoing (interventions implemented as appropriate)  Goal: Signs and Symptoms of Listed Potential Problems Will be Absent or Manageable (Perioperative Period)  Outcome: Ongoing (interventions implemented as appropriate)    Problem: Knee Replacement, Total (Adult)  Goal: Signs and Symptoms of Listed Potential Problems Will be Absent or Manageable (Knee Replacement, Total)  Outcome: Ongoing (interventions implemented as appropriate)    Problem: Fall Risk (Adult)  Goal: Identify Related Risk Factors and Signs and Symptoms  Outcome: Outcome(s) achieved Date Met:  03/15/17  Goal: Absence of Falls  Outcome: Ongoing (interventions implemented as appropriate)

## 2017-03-15 NOTE — PROGRESS NOTES
Acute Care - Physical Therapy Initial Evaluation  [facility]     Patient Name: Holly Bateman  : 1960  MRN: [id number]  Today's Date: 3/15/2017                Admit Date: 3/14/2017     Visit Dx:    ICD-10-CM ICD-9-CM   1. Difficulty walking R26.2 719.7     Patient Active Problem List   Diagnosis   • OA (osteoarthritis) of knee     Past Medical History   Diagnosis Date   • Arthritis    • Knee pain, bilateral    • Meniere disease    • Unspecified obstruction of eustachian tube, left ear      Past Surgical History   Procedure Laterality Date   • Knee surgery Bilateral      Torn meniscus   • Foot surgery Bilateral 2010     Plantar Fascitis   • Hysterectomy     • Tonsillectomy     • Tubal abdominal ligation     • Bilateral breast reduction     • Belpharoptosis repair     • Lift / repair brow ptosis forehead            PT ASSESSMENT (last 72 hours)      PT Evaluation       03/15/17 0855 03/15/17 0132    Rehab Evaluation    Document Type evaluation  -     Subjective Information agree to therapy;no complaints  -KH     Patient Effort, Rehab Treatment good  -     Symptoms Noted During/After Treatment increased pain  -KH     General Information    General Observations in bathroom  -     Pertinent History Of Current Problem s/p L TKR  -     Precautions/Limitations fall precautions  -     Prior Level of Function independent:  -KH     Equipment Currently Used at Home none  -KH none  -KB    Plans/Goals Discussed With patient  -     Living Environment    Number of Stairs to Enter Home 3  -KH     Transportation Available  car;family or friend will provide  -KB    Clinical Impression    Patient/Family Goals Statement return to OF  -     Criteria for Skilled Therapeutic Interventions Met yes;treatment indicated  -     Impairments Found (describe specific impairments) gait, locomotion, and balance;ROM  -     Rehab Potential good, to achieve stated therapy goals  -     Pain Assessment    Pain Assessment  0-10  -     Pain Score 8  -     Pain Location Knee  -     Pain Orientation Left  -     Pain Intervention(s) Repositioned;Ambulation/increased activity  -     Response to Interventions tolerated  -     Cognitive Assessment/Intervention    Current Cognitive/Communication Assessment functional  -     Orientation Status oriented x 4  -     Follows Commands/Answers Questions 100% of the time  -     Personal Safety WNL/WFL  -     Personal Safety Interventions gait belt;fall prevention program maintained  -     ROM (Range of Motion)    General ROM Detail [facility] except L knee 10-70  -     MMT (Manual Muscle Testing)    General MMT Assessment Detail WFL  -     Bed Mobility, Assessment/Treatment    Bed Mob, Supine to Sit, Walker not tested  -     Bed Mobility, Comment up in chair  -     Transfer Assessment/Treatment    Transfers, Sit-Stand Walker stand by assist  -     Transfers, Stand-Sit Walker stand by assist  -     Transfers, Sit-Stand-Sit, Assist Device rolling walker  -     Gait Assessment/Treatment    Gait, Walker Level contact guard assist  -     Gait, Assistive Device rolling walker  -     Gait, Distance (Feet) 40  -     Gait, Gait Deviations antalgic;zenobia decreased  -     Gait, Impairments pain  -     Therapy Exercises    Exercise Protocols total knee  -     Total Knee Exercises left:;10 reps;completed protocol  -     Positioning and Restraints    Pre-Treatment Position bathroom  -     Post Treatment Position chair  -     In Chair reclined;call light within reach;encouraged to call for assist;notified [facility]       03/14/17 6714       General Information    Equipment Currently Used at Home none  -NO     Living Environment    Lives With alone  -NO     Living Arrangements house  -NO     Home Accessibility no concerns  -NO     Stair Railings at Home inside, present at both sides  -NO     Type of Financial/Environmental Concern none  -NO      Transportation Available car  -NO       User Key  (r) = Recorded By, (t) = Taken By, (c) = Cosigned By    Initials Name Provider Type     Jazmin Bingham, PT Physical Therapist    JEANNE Longo, RN Registered Nurse    NO Evelyn Torres, RN Registered Nurse          Physical Therapy Education     Title: PT OT SLP Therapies (Active)     Topic: Physical Therapy (Active)     Point: Mobility training (Done)    Learning Progress Summary    Learner Readiness Method Response Comment Documented by Status   Patient Acceptance E [facility] 03/15/17 0858 Done               Point: Home exercise program (Done)    Learning Progress Summary    Learner Readiness Method Response Comment Documented by Status   Patient Acceptance E [facility] 03/15/17 0858 Done                      User Key     Initials Effective Dates Name Provider Type Discipline     05/18/15 -  Jazmin Bingham, PT Physical Therapist PT                PT Recommendation and Plan  Anticipated Discharge Disposition: home with home health, skilled nursing facility  Planned Therapy Interventions: bed mobility training, gait training, home exercise program, patient/family education, ROM (Range of Motion), strengthening, stair training, transfer training  PT Frequency: 2 times/day  Plan of Care Review  Outcome Summary/Follow up Plan: Pt presents with pain and limited ROM post-op L TKA. PT would benefit from PT to address strengthening, ROM and gait training.          IP PT Goals       03/15/17 0859          Bed Mobility PT LTG    Bed Mobility PT LTG, Date Established 03/15/17  -      Bed Mobility PT LTG, Time to Achieve 3 days  -      Bed Mobility PT LTG, Activity Type all bed mobility  -      Bed Mobility PT LTG, [place] Level independent  -      Transfer Training PT LTG    Transfer Training PT LTG, Date Established 03/15/17  -      Transfer Training PT LTG, Time to Achieve 3 days  -      Transfer Training PT LTG, Activity Type all  transfers  -KH      Transfer Training PT LTG, [place] Level supervision required  -KH      Transfer Training PT LTG, Assist Device walker, rolling  -KH      Gait Training PT LTG    Gait Training Goal PT LTG, Date Established 03/15/17  -KH      Gait Training Goal PT LTG, Time to Achieve 3 days  -KH      Gait Training Goal PT LTG, [place] Level supervision required  -KH      Gait Training Goal PT LTG, Assist Device walker, rolling  -KH      Gait Training Goal PT LTG, Distance to Achieve 75 ft  -KH      Stair Training PT LTG    Stair Training Goal PT LTG, Date Established 03/15/17  -KH      Stair Training Goal PT LTG, Time to Achieve 3 days  -KH      Stair Training Goal PT LTG, Number of Steps 3  -KH      Stair Training Goal PT LTG, [place] Level contact guard assist  -KH      Stair Training Goal PT LTG, Assist Device 1 handrail  -KH      Range of Motion PT LTG    Range of Motion Goal PT LTG, Date Established 03/15/17  -KH      Range of Motion Goal PT LTG, Time to Achieve 3 days  -KH      Range fo Motion Goal PT LTG, Joint L knee  -KH      Range of Motion Goal PT LTG, AROM Measure 5-85  -KH      Patient Education PT LTG    Patient Education PT LTG, Date Established 03/15/17  -KH      Patient Education PT LTG, Time to Achieve 3 days  -KH      Patient Education PT LTG, Education Type HEP  -KH      Patient Education PT LTG, Education Understanding demonstrate adequately  -KH        User Key  (r) = Recorded By, (t) = Taken By, (c) = Cosigned By    Initials Name Provider Type    CORRINA Bingham, PT Physical Therapist                Outcome Measures       03/15/17 0900          How much help from another person do you currently need...    Turning from your back to your side while in flat bed without using bedrails? 4  -KH      Moving from lying on back to sitting on the side of a flat bed without bedrails? 4  -KH      Moving to and from a bed to a chair (including a wheelchair)? 3  -KH      Standing  up from a chair using your arms (e.g., wheelchair, bedside chair)? 3  -KH      Climbing 3-5 steps with a railing? 3  -KH      To walk in hospital room? 3  -KH      AM-PAC 6 Clicks Score 20  -KH      Functional Assessment    Outcome Measure Options AM-PAC 6 Clicks Basic Mobility (PT)  -        User Key  (r) = Recorded By, (t) = Taken By, (c) = Cosigned By    Initials Name Provider Type    CORRINA Bingham, PT Physical Therapist           Time Calculation:         PT Charges       03/15/17 0901          Time Calculation    Start Time 0853  -      Stop Time 0910  -      Time Calculation (min) 17 min  -KH      PT Received On 03/15/17  -CORRINA      PT - Next Appointment 03/15/17  -CORRINA      PT Goal Re-Cert Due Date 03/18/17  -        User Key  (r) = Recorded By, (t) = Taken By, (c) = Cosigned By    Initials Name Provider Type    CORRINA Bingham, PT Physical Therapist          Therapy Charges for Today     Code Description Service Date Service Provider Modifiers Qty    02501087589 HC PT EVAL MOD COMPLEXITY 2 3/15/2017 Jazmin Bingham, PT GP 1    61266843663 HC PT THER PROC EA 15 MIN 3/15/2017 Jazmin Bingham, PT GP 1          PT G-Codes  Outcome Measure Options: AM-PAC 6 Clicks Basic Mobility (PT)      Jazmin Bingham, PT  3/15/2017

## 2017-03-16 LAB
HCT VFR BLD AUTO: 34.6 % (ref 35.6–45.5)
HGB BLD-MCNC: 11.6 G/DL (ref 11.9–15.5)

## 2017-03-16 PROCEDURE — 25010000002 MORPHINE SULFATE (PF) 2 MG/ML SOLUTION: Performed by: ORTHOPAEDIC SURGERY

## 2017-03-16 PROCEDURE — 85014 HEMATOCRIT: CPT | Performed by: ORTHOPAEDIC SURGERY

## 2017-03-16 PROCEDURE — 85018 HEMOGLOBIN: CPT | Performed by: ORTHOPAEDIC SURGERY

## 2017-03-16 PROCEDURE — 97150 GROUP THERAPEUTIC PROCEDURES: CPT

## 2017-03-16 PROCEDURE — 97110 THERAPEUTIC EXERCISES: CPT

## 2017-03-16 RX ORDER — OXYCODONE AND ACETAMINOPHEN 7.5; 325 MG/1; MG/1
1 TABLET ORAL EVERY 4 HOURS PRN
Status: DISCONTINUED | OUTPATIENT
Start: 2017-03-16 | End: 2017-03-17 | Stop reason: HOSPADM

## 2017-03-16 RX ORDER — OXYCODONE AND ACETAMINOPHEN 7.5; 325 MG/1; MG/1
2 TABLET ORAL EVERY 4 HOURS PRN
Status: DISCONTINUED | OUTPATIENT
Start: 2017-03-16 | End: 2017-03-17 | Stop reason: HOSPADM

## 2017-03-16 RX ADMIN — ASPIRIN 325 MG: 325 TABLET, DELAYED RELEASE ORAL at 08:02

## 2017-03-16 RX ADMIN — ESTRADIOL 1 PATCH: 0.07 PATCH, EXTENDED RELEASE TRANSDERMAL at 08:01

## 2017-03-16 RX ADMIN — MORPHINE SULFATE 6 MG: 2 INJECTION, SOLUTION INTRAMUSCULAR; INTRAVENOUS at 04:55

## 2017-03-16 RX ADMIN — OXYCODONE HYDROCHLORIDE AND ACETAMINOPHEN 2 TABLET: 7.5; 325 TABLET ORAL at 20:07

## 2017-03-16 RX ADMIN — KETOTIFEN FUMARATE 1 DROP: 0.25 SOLUTION/ DROPS OPHTHALMIC at 06:35

## 2017-03-16 RX ADMIN — OXYCODONE HYDROCHLORIDE AND ACETAMINOPHEN 2 TABLET: 5; 325 TABLET ORAL at 03:47

## 2017-03-16 RX ADMIN — DOCUSATE SODIUM,SENNOSIDES 2 TABLET: 50; 8.6 TABLET, FILM COATED ORAL at 08:02

## 2017-03-16 RX ADMIN — OXYCODONE HYDROCHLORIDE AND ACETAMINOPHEN 2 TABLET: 7.5; 325 TABLET ORAL at 23:54

## 2017-03-16 RX ADMIN — MUPIROCIN: 20 OINTMENT TOPICAL at 08:02

## 2017-03-16 RX ADMIN — MAGNESIUM HYDROXIDE 10 ML: 2400 SUSPENSION ORAL at 18:22

## 2017-03-16 RX ADMIN — OXYCODONE HYDROCHLORIDE AND ACETAMINOPHEN 2 TABLET: 7.5; 325 TABLET ORAL at 16:08

## 2017-03-16 RX ADMIN — OXYCODONE HYDROCHLORIDE AND ACETAMINOPHEN 2 TABLET: 7.5; 325 TABLET ORAL at 08:02

## 2017-03-16 RX ADMIN — FERROUS SULFATE TAB 325 MG (65 MG ELEMENTAL FE) 325 MG: 325 (65 FE) TAB at 08:02

## 2017-03-16 RX ADMIN — OXYCODONE HYDROCHLORIDE AND ACETAMINOPHEN 2 TABLET: 7.5; 325 TABLET ORAL at 12:13

## 2017-03-16 RX ADMIN — DOCUSATE SODIUM,SENNOSIDES 2 TABLET: 50; 8.6 TABLET, FILM COATED ORAL at 18:20

## 2017-03-16 RX ADMIN — CELECOXIB 200 MG: 200 CAPSULE ORAL at 08:02

## 2017-03-16 NOTE — PLAN OF CARE
Problem: Patient Care Overview (Adult)  Goal: Plan of Care Review  Outcome: Ongoing (interventions implemented as appropriate)    03/16/17 1156   Coping/Psychosocial Response Interventions   Plan Of Care Reviewed With patient   Patient Care Overview   Progress improving   Outcome Evaluation   Outcome Summary/Follow up Plan incr amb dist despite incr pain

## 2017-03-16 NOTE — PROGRESS NOTES
"Ortho POD 2    Patient Name:  Holly Bateman  YOB: 1960  Medical Records Number:  [id number]    Complaints of pain     Visit Vitals   • /79 (BP Location: Left arm, Patient Position: Lying)   • Pulse 88   • Temp 98.4 °F (36.9 °C) (Oral)   • Resp 16   • Ht 64\" (162.6 cm)   • Wt 172 lb 2 oz (78.1 kg)   • SpO2 94%   • BMI 29.55 kg/m2       LLE:  NVI, calf nontender, sensation intact  No signs of DVT    Incision: clean, no infection    Lab Results (last 24 hours)     Procedure Component Value Units Date/Time    Hemoglobin & Hematocrit, Blood [[id number]]  (Abnormal) Collected:  03/16/17 0500    Specimen:  Blood Updated:  03/16/17 0523     Hemoglobin 11.6 (L) g/dL      Hematocrit 34.6 (L) %           S/p Left TKA  WBAT with walker  ASA for DVT prophylaxis  Dc home with  tomorrow   "

## 2017-03-16 NOTE — PROGRESS NOTES
Acute Care - Physical Therapy Treatment Note  [facility]     Patient Name: Holly Bateman  : 1960  MRN: [id number]  Today's Date: 3/16/2017             Admit Date: 3/14/2017    Visit Dx:    ICD-10-CM ICD-9-CM   1. Difficulty walking R26.2 719.7     Patient Active Problem List   Diagnosis   • OA (osteoarthritis) of knee               Adult Rehabilitation Note       17 1152 03/15/17 1600       Rehab Assessment/Intervention    Discipline physical therapy assistant  - physical therapy assistant  -     Document Type therapy note (daily note)  - therapy note (daily note)  -     Subjective Information agree to therapy;complains of;pain  -JM agree to therapy;complains of;pain  -JM     Symptoms Noted During/After Treatment increased pain  -JM increased pain  -JM     Precautions/Limitations fall precautions  - fall precautions  -     Recorded by [STEVEN] Mikaela Coyle PTA [JM] Mikaela Coyle PTA     Pain Assessment    Pain Assessment 0-10  -JM 0-10  -JM     Pain Score 8  -JM 10  -JM     Post Pain Score 10  -JM 6  -JM     Pain Type Surgical pain  -JM Surgical pain  -JM     Pain Location Knee  -JM Knee  -JM     Pain Orientation Left  -JM Left  -JM     Pain Intervention(s) Medication (See MAR);Repositioned;Cold applied  - Medication (See MAR);Repositioned;Ambulation/increased activity;Cold applied  -     Response to Interventions marissa , unchanged  -JM marissa  -JM     Recorded by [JM] Mikaela Coyle PTA [JM] Mikaela Coyle PTA     ROM (Range of Motion)    General ROM Detail  -  -JM     Recorded by  [STEVEN] Mikaela Coyle PTA     Bed Mobility, Assessment/Treatment    Bed Mob, Supine to Sit, [place] contact guard assist  -      Bed Mobility, Comment due to incr pain  -JM in chair  -JM     Recorded by [STEVEN] Mikaela Coyle PTA [JM] Mikaela Coyle PTA     Transfer Assessment/Treatment    Transfers, Sit-Stand [place] stand by assist;verbal cues required  - stand by assist;verbal  cues required  -     Transfers, Stand-Sit [place] stand by assist;verbal cues required  -JM stand by assist;verbal cues required  -JM     Transfers, Sit-Stand-Sit, Assist Device rolling walker  - rolling walker  -JM     Recorded by [JM] Mikaela Coyle PTA [JM] Mikaela Coyle PTA     Gait Assessment/Treatment    Gait, [place] Level contact guard assist;stand by assist;verbal cues required  - contact guard assist;verbal cues required  -     Gait, Assistive Device rolling walker  -JM rolling walker  -JM     Gait, Distance (Feet) 100  -JM 65  -JM     Gait, Gait Deviations antalgic;zenobia decreased;step length decreased  - antalgic;zenobia decreased;forward flexed posture;step length decreased  -     Gait, Safety Issues  step length decreased  -     Gait, Impairments pain  - pain  -JM     Recorded by [STEVEN] Mikaela Coyle PTA [JM] Mikaela Coyle PTA     Stairs Assessment/Treatment    Number of Stairs 3  -JM      Stairs, Handrail Location none  -      Stairs, [place] Level contact guard assist;verbal cues required  -      Stairs, Assistive Device walker  -      Stairs, Technique Used step to step (ascending);step to step (descending)  -      Stairs, Comment backward w/supported wx  -JM      Recorded by [JM] Mikaela Coyle PTA      Therapy Exercises    Exercise Protocols total knee  -JM total knee  -     Total Knee Exercises left:;15 reps;completed protocol;20 reps   perf most exer bilat due to rt le also needs sx  -JM left:;15 reps;completed protocol   perf most exer bilat due to rt le also needs sx  -JM     Recorded by [STEVEN] Mikaela Coyle PTA [JM] Mikaela Coyle PTA     Positioning and Restraints    Pre-Treatment Position sitting in chair/recliner  -JM sitting in chair/recliner  -JM     In Chair reclined;call light within reach;encouraged to call for assist  -JM reclined;with family/caregiver;encouraged to call for assist  -JM     Recorded by [JM] Mikaela Coyle PTA  [JM] Mikaela Coyle PTA       User Key  (r) = Recorded By, (t) = Taken By, (c) = Cosigned By    Initials Name Effective Dates    STEVEN Coyle PTA 02/18/16 -                 IP PT Goals       03/15/17 0859          Bed Mobility PT LTG    Bed Mobility PT LTG, Date Established 03/15/17  -KH      Bed Mobility PT LTG, Time to Achieve 3 days  -KH      Bed Mobility PT LTG, Activity Type all bed mobility  -KH      Bed Mobility PT LTG, [place] Level independent  -KH      Transfer Training PT LTG    Transfer Training PT LTG, Date Established 03/15/17  -KH      Transfer Training PT LTG, Time to Achieve 3 days  -KH      Transfer Training PT LTG, Activity Type all transfers  -KH      Transfer Training PT LTG, [place] Level supervision required  -KH      Transfer Training PT LTG, Assist Device walker, rolling  -KH      Gait Training PT LTG    Gait Training Goal PT LTG, Date Established 03/15/17  -KH      Gait Training Goal PT LTG, Time to Achieve 3 days  -KH      Gait Training Goal PT LTG, [place] Level supervision required  -KH      Gait Training Goal PT LTG, Assist Device walker, rolling  -KH      Gait Training Goal PT LTG, Distance to Achieve 75 ft  -KH      Stair Training PT LTG    Stair Training Goal PT LTG, Date Established 03/15/17  -KH      Stair Training Goal PT LTG, Time to Achieve 3 days  -KH      Stair Training Goal PT LTG, Number of Steps 3  -KH      Stair Training Goal PT LTG, [place] Level contact guard assist  -KH      Stair Training Goal PT LTG, Assist Device 1 handrail  -KH      Range of Motion PT LTG    Range of Motion Goal PT LTG, Date Established 03/15/17  -KH      Range of Motion Goal PT LTG, Time to Achieve 3 days  -KH      Range fo Motion Goal PT LTG, Joint L knee  -KH      Range of Motion Goal PT LTG, AROM Measure 5-85  -KH      Patient Education PT LTG    Patient Education PT LTG, Date Established 03/15/17  -KH      Patient Education PT LTG, Time to Achieve 3 days  -KH       Patient Education PT LTG, Education Type HEP  -KH      Patient Education PT LTG, Education Understanding demonstrate adequately  -        User Key  (r) = Recorded By, (t) = Taken By, (c) = Cosigned By    Initials Name Provider Type    CORRINA Bingham, PT Physical Therapist          Physical Therapy Education     Title: PT OT SLP Therapies (Done)     Topic: Physical Therapy (Done)     Point: Mobility training (Done)    Learning Progress Summary    Learner Readiness Method Response Comment Documented by Status   Patient Acceptance E,TB [facility] 03/16/17 1155 Done    Acceptance E [facility] 03/15/17 0858 Done               Point: Home exercise program (Done)    Learning Progress Summary    Learner Readiness Method Response Comment Documented by Status   Patient Acceptance E,[facility] 03/16/17 1155 Done    Acceptance E [facility] 03/15/17 0858 Done               Point: Body mechanics (Done)    Learning Progress Summary    Learner Readiness Method Response Comment Documented by Status   Patient Acceptance E,LARRY [facility] 03/16/17 1155 Done               Point: Precautions (Done)    Learning Progress Summary    Learner Readiness Method Response Comment Documented by Status   Patient Acceptance E,[facility] 03/16/17 1155 Done                      User Key     Initials Effective Dates Name Provider Type Discipline     05/18/15 -  Jazmin Bingham, PT Physical Therapist PT     02/18/16 -  Mikaela Coyle, PTA Physical Therapy Assistant PT                    PT Recommendation and Plan  Anticipated Discharge Disposition: home with home health, skilled nursing facility  Planned Therapy Interventions: bed mobility training, gait training, home exercise program, patient/family education, ROM (Range of Motion), strengthening, stair training, transfer training  PT Frequency: 2 times/day  Plan of Care Review  Plan Of Care Reviewed With: patient  Progress: improving  Outcome Summary/Follow up Plan: incr amb dist despite incr  pain          Outcome Measures       03/16/17 1100 03/15/17 0900       How much help from another person do you currently need...    Turning from your back to your side while in flat bed without using bedrails? 4  -JM 4  -KH     Moving from lying on back to sitting on the side of a flat bed without bedrails? 3  - 4  -KH     Moving to and from a bed to a chair (including a wheelchair)? 4  - 3  -KH     Standing up from a chair using your arms (e.g., wheelchair, bedside chair)? 3  - 3  -KH     Climbing 3-5 steps with a railing? 3  - 3  -KH     To walk in hospital room? 3  - 3  -KH     AM-PAC 6 Clicks Score 20  - 20  -KH     Functional Assessment    Outcome Measure Options  AM-PAC 6 Clicks Basic Mobility (PT)  -       User Key  (r) = Recorded By, (t) = Taken By, (c) = Cosigned By    Initials Name Provider Type     Jazmin Bingham, PT Physical Therapist    STEVEN Coyle PTA Physical Therapy Assistant           Time Calculation:         PT Charges       03/16/17 1156          Time Calculation    Start Time 0836  -      Stop Time 0921  -      Time Calculation (min) 45 min  -      PT Received On 03/16/17  -STEVEN      PT - Next Appointment 03/16/17  -STEVEN        User Key  (r) = Recorded By, (t) = Taken By, (c) = Cosigned By    Initials Name Provider Type    STEVEN Coyle PTA Physical Therapy Assistant          Therapy Charges for Today     Code Description Service Date Service Provider Modifiers Qty    31069987062 HC PT THER PROC EA 15 MIN 3/15/2017 Mikaela Coyle PTA GP 1    52384391644 HC PT THER PROC GROUP 3/15/2017 Mikaela Coyle PTA GP 1    08665730464 HC PT THER PROC EA 15 MIN 3/16/2017 Mikaela Coyle PTA GP 1    44919174796 HC PT THER PROC GROUP 3/16/2017 Mikaela Coyle PTA GP 1          PT G-Codes  Outcome Measure Options: AM-PAC 6 Clicks Basic Mobility (PT)    Mikaela Coyle PTA  3/16/2017

## 2017-03-16 NOTE — PLAN OF CARE
Problem: Patient Care Overview (Adult)  Goal: Plan of Care Review  Outcome: Ongoing (interventions implemented as appropriate)    03/16/17 7360   Coping/Psychosocial Response Interventions   Plan Of Care Reviewed With patient   Patient Care Overview   Progress improving   Outcome Evaluation   Outcome Summary/Follow up Plan PAIN WAS BETTER CONTROLLED AFTER INCREASE, AMBULATING BETTER, STILL DECIDING BETWEEN HOME AND [facility] FOR DISCHARGE TOMORROW       Goal: Adult Individualization and Mutuality  Outcome: Ongoing (interventions implemented as appropriate)    Problem: Knee Replacement, Total (Adult)  Goal: Signs and Symptoms of Listed Potential Problems Will be Absent or Manageable (Knee Replacement, Total)  Outcome: Ongoing (interventions implemented as appropriate)    Problem: Fall Risk (Adult)  Goal: Absence of Falls  Outcome: Ongoing (interventions implemented as appropriate)

## 2017-03-16 NOTE — PLAN OF CARE
Problem: Patient Care Overview (Adult)  Goal: Plan of Care Review  Outcome: Ongoing (interventions implemented as appropriate)    03/16/17 0426   Coping/Psychosocial Response Interventions   Plan Of Care Reviewed With patient   Patient Care Overview   Progress improving   Outcome Evaluation   Outcome Summary/Follow up Plan VSS. Pain is somewhat controlled with po pain medication. pt able to ambulate with assist x1 and walker        Goal: Adult Individualization and Mutuality  Outcome: Ongoing (interventions implemented as appropriate)  Goal: Discharge Needs Assessment  Outcome: Ongoing (interventions implemented as appropriate)    Problem: Perioperative Period (Adult)  Goal: Signs and Symptoms of Listed Potential Problems Will be Absent or Manageable (Perioperative Period)  Outcome: Ongoing (interventions implemented as appropriate)  Goal: Signs and Symptoms of Listed Potential Problems Will be Absent or Manageable (Perioperative Period)  Outcome: Ongoing (interventions implemented as appropriate)    Problem: Knee Replacement, Total (Adult)  Goal: Signs and Symptoms of Listed Potential Problems Will be Absent or Manageable (Knee Replacement, Total)  Outcome: Ongoing (interventions implemented as appropriate)    Problem: Fall Risk (Adult)  Goal: Absence of Falls  Outcome: Ongoing (interventions implemented as appropriate)

## 2017-03-16 NOTE — PROGRESS NOTES
Acute Care - Physical Therapy Treatment Note  [facility]     Patient Name: Holly Bateman  : 1960  MRN: [id number]  Today's Date: 3/16/2017             Admit Date: 3/14/2017    Visit Dx:    ICD-10-CM ICD-9-CM   1. Difficulty walking R26.2 719.7     Patient Active Problem List   Diagnosis   • OA (osteoarthritis) of knee               Adult Rehabilitation Note       17 1639 17 1152 03/15/17 1600    Rehab Assessment/Intervention    Discipline physical therapy assistant  -STEVEN physical therapy assistant  - physical therapy assistant  -    Document Type therapy note (daily note)  -STEVEN therapy note (daily note)  - therapy note (daily note)  -STEVEN    Subjective Information agree to therapy;complains of;weakness;fatigue;pain;swelling  - agree to therapy;complains of;pain  - agree to therapy;complains of;pain  -    Symptoms Noted During/After Treatment increased pain  -JM increased pain  -JM increased pain  -JM    Precautions/Limitations fall precautions  - fall precautions  - fall precautions  -JM    Recorded by [] Mikaela Coyle PTA [] Mikaela Coyle PTA [JM] Mikaela Coyle PTA    Pain Assessment    Pain Assessment 0-10  -JM 0-10  -JM 0-10  -JM    Pain Score 10  -JM 8  -JM 10  -JM    Post Pain Score  10  -JM 6  -JM    Pain Type Surgical pain  -JM Surgical pain  -JM Surgical pain  -JM    Pain Location Knee  -JM Knee  -JM Knee  -JM    Pain Orientation Left  -JM Left  -JM Left  -JM    Pain Intervention(s) Medication (See MAR);Repositioned;Cold applied  -JM Medication (See MAR);Repositioned;Cold applied  - Medication (See MAR);Repositioned;Ambulation/increased activity;Cold applied  -JM    Response to Interventions unchanged  -JM marissa , unchanged  -JM marissa  -JM    Recorded by [STEVEN] Mikaela Coyle PTA [JM] Mikaela Coyle PTA [JM] Mikaela Coyle PTA    ROM (Range of Motion)    General ROM Detail   -  -JM    Recorded by   [JM] Mikaela Coyle PTA    Bed Mobility,  Assessment/Treatment    Bed Mob, Supine to Sit, [place] contact guard assist  - contact guard assist  -     Bed Mobility, Comment  due to incr pain  -JM in chair  -    Recorded by [JM] Mikaela Coyle PTA [JM] Mikaela Coyle PTA [JM] Mikaela Coyle PTA    Transfer Assessment/Treatment    Transfers, Sit-Stand [place] stand by assist;verbal cues required  - stand by assist;verbal cues required  - stand by assist;verbal cues required  -    Transfers, Stand-Sit [place] stand by assist;verbal cues required  - stand by assist;verbal cues required  - stand by assist;verbal cues required  -    Transfers, Sit-Stand-Sit, Assist Device rolling walker  - rolling walker  - rolling walker  -    Recorded by [JM] Mikaela Coyle PTA [JM] Mikaela Coyle PTA [JM] Mikaela Coyle PTA    Gait Assessment/Treatment    Gait, [place] Level contact guard assist;stand by assist;verbal cues required  - contact guard assist;stand by assist;verbal cues required  - contact guard assist;verbal cues required  -    Gait, Assistive Device rolling walker  - rolling walker  - rolling walker  -    Gait, Distance (Feet) 110  -  -JM 65  -JM    Gait, Gait Deviations antalgic;zenobia decreased;forward flexed posture;decreased heel strike;step length decreased  - antalgic;zenobia decreased;step length decreased  - antalgic;zenobia decreased;forward flexed posture;step length decreased  -    Gait, Safety Issues   step length decreased  -    Gait, Impairments pain  - pain  -JM pain  -JM    Recorded by [JM] Mikaela Coyle PTA [JM] Mikaela Coyle PTA [JM] Mikaela Coyle PTA    Stairs Assessment/Treatment    Number of Stairs 3  -JM 3  -JM     Stairs, Handrail Location none  -JM none  -JM     Stairs, [place] Level contact guard assist;verbal cues required  - contact guard assist;verbal cues required  -     Stairs, Assistive Device walker  - walker  -     Stairs,  Technique Used step to step (ascending);step to step (descending)  -JM step to step (ascending);step to step (descending)  -JM     Stairs, Comment friend present for educ  -JM backward w/supported wx  -JM     Recorded by [STEVEN] Mikaela Coyle PTA [STEVEN] Mikaela Coyle PTA     Therapy Exercises    Exercise Protocols total knee  -JM total knee  -JM total knee  -JM    Total Knee Exercises left:;completed protocol;25 reps   perf most exer bilat due to rt le also needs sx  -JM left:;15 reps;completed protocol;20 reps   perf most exer bilat due to rt le also needs sx  -JM left:;15 reps;completed protocol   perf most exer bilat due to rt le also needs sx  -JM    Recorded by [STEVEN] Mikaela Coyle PTA [STEVEN] Mikaela Coyle PTA [STEVEN] Mikaela Coyle PTA    Positioning and Restraints    Pre-Treatment Position sitting in chair/recliner  -JM sitting in chair/recliner  -JM sitting in chair/recliner  -JM    In Chair reclined;call light within reach;encouraged to call for assist;with family/caregiver   friend present  -JM reclined;call light within reach;encouraged to call for assist  -JM reclined;with family/caregiver;encouraged to call for assist  -JM    Recorded by [STEVEN] Mikaela Coyle PTA [STEVEN] Mikaela Coyle PTA [STEVEN] Mikaela Coyle PTA      User Key  (r) = Recorded By, (t) = Taken By, (c) = Cosigned By    Initials Name Effective Dates    STEVEN Coyle PTA 02/18/16 -                 IP PT Goals       03/15/17 0859          Bed Mobility PT LTG    Bed Mobility PT LTG, Date Established 03/15/17  -KH      Bed Mobility PT LTG, Time to Achieve 3 days  -KH      Bed Mobility PT LTG, Activity Type all bed mobility  -KH      Bed Mobility PT LTG, [place] Level independent  -KH      Transfer Training PT LTG    Transfer Training PT LTG, Date Established 03/15/17  -KH      Transfer Training PT LTG, Time to Achieve 3 days  -KH      Transfer Training PT LTG, Activity Type all transfers  -KH      Transfer Training PT LTG,  [place] Level supervision required  -KH      Transfer Training PT LTG, Assist Device walker, rolling  -KH      Gait Training PT LTG    Gait Training Goal PT LTG, Date Established 03/15/17  -KH      Gait Training Goal PT LTG, Time to Achieve 3 days  -KH      Gait Training Goal PT LTG, [place] Level supervision required  -KH      Gait Training Goal PT LTG, Assist Device walker, rolling  -KH      Gait Training Goal PT LTG, Distance to Achieve 75 ft  -KH      Stair Training PT LTG    Stair Training Goal PT LTG, Date Established 03/15/17  -KH      Stair Training Goal PT LTG, Time to Achieve 3 days  -KH      Stair Training Goal PT LTG, Number of Steps 3  -KH      Stair Training Goal PT LTG, [place] Level contact guard assist  -KH      Stair Training Goal PT LTG, Assist Device 1 handrail  -KH      Range of Motion PT LTG    Range of Motion Goal PT LTG, Date Established 03/15/17  -KH      Range of Motion Goal PT LTG, Time to Achieve 3 days  -KH      Range fo Motion Goal PT LTG, Joint L knee  -KH      Range of Motion Goal PT LTG, AROM Measure 5-85  -KH      Patient Education PT LTG    Patient Education PT LTG, Date Established 03/15/17  -KH      Patient Education PT LTG, Time to Achieve 3 days  -KH      Patient Education PT LTG, Education Type HEP  -KH      Patient Education PT LTG, Education Understanding demonstrate adequately  -KH        User Key  (r) = Recorded By, (t) = Taken By, (c) = Cosigned By    Initials Name Provider Type    CORRINA Bingham, PT Physical Therapist          Physical Therapy Education     Title: PT OT SLP Therapies (Done)     Topic: Physical Therapy (Done)     Point: Mobility training (Done)    Learning Progress Summary    Learner Readiness Method Response Comment Documented by Status   Patient Acceptance E,TB [facility] 03/16/17 1155 Done    Acceptance E [facility] 03/15/17 0858 Done               Point: Home exercise program (Done)    Learning Progress Summary    Learner Readiness  Method Response Comment Documented by Status   Patient Acceptance E,LARRY [facility]ISH[facility]   03/16/17 1155 Done    Acceptance E [facility] 03/15/17 0858 Done               Point: Body mechanics (Done)    Learning Progress Summary    Learner Readiness Method Response Comment Documented by Status   Patient Acceptance E,LARRY [facility] 03/16/17 1155 Done               Point: Precautions (Done)    Learning Progress Summary    Learner Readiness Method Response Comment Documented by Status   Patient Acceptance E,LARRY [facility] 03/16/17 1155 Done                      User Key     Initials Effective Dates Name Provider Type Discipline     05/18/15 -  Jazmin Bingham, PT Physical Therapist PT     02/18/16 -  Mikaela Coyle, PTA Physical Therapy Assistant PT                    PT Recommendation and Plan  Anticipated Discharge Disposition: home with home health, skilled nursing facility  Planned Therapy Interventions: bed mobility training, gait training, home exercise program, patient/family education, ROM (Range of Motion), strengthening, stair training, transfer training  PT Frequency: 2 times/day  Plan of Care Review  Plan Of Care Reviewed With: patient  Progress: improving  Outcome Summary/Follow up Plan: incr amb dist despite incr pain          Outcome Measures       03/16/17 1100 03/15/17 0900       How much help from another person do you currently need...    Turning from your back to your side while in flat bed without using bedrails? 4  - 4  -KH     Moving from lying on back to sitting on the side of a flat bed without bedrails? 3  - 4  -KH     Moving to and from a bed to a chair (including a wheelchair)? 4  - 3  -KH     Standing up from a chair using your arms (e.g., wheelchair, bedside chair)? 3  - 3  -KH     Climbing 3-5 steps with a railing? 3  - 3  -KH     To walk in hospital room? 3  - 3  -KH     AM-PAC 6 Clicks Score 20  - 20  -KH     Functional Assessment    Outcome Measure Options  AM-PAC 6 Clicks Basic Mobility  (PT)  -CORRINA       User Key  (r) = Recorded By, (t) = Taken By, (c) = Cosigned By    Initials Name Provider Type    CORRINA Bingham, PT Physical Therapist    STEVEN Coyle PTA Physical Therapy Assistant           Time Calculation:         PT Charges       03/16/17 1642 03/16/17 1156       Time Calculation    Start Time 1310  - 0836  -     Stop Time 1400  - 0921  -     Time Calculation (min) 50 min  - 45 min  -     PT Received On 03/16/17  -STEVEN 03/16/17  -STEVEN     PT - Next Appointment 03/17/17  - 03/16/17  -STEVEN       User Key  (r) = Recorded By, (t) = Taken By, (c) = Cosigned By    Initials Name Provider Type    STEVEN Coyle PTA Physical Therapy Assistant          Therapy Charges for Today     Code Description Service Date Service Provider Modifiers Qty    77146292651 HC PT THER PROC EA 15 MIN 3/15/2017 Mikaela Coyle, MESSI GP 1    39022293968 HC PT THER PROC GROUP 3/15/2017 Mikaela Coyle PTA GP 1    32225592882 HC PT THER PROC GROUP 3/16/2017 Mikaela Coyle PTA GP 1    62248248891 HC PT THER PROC EA 15 MIN 3/16/2017 Mikaela Coyle PTA GP 2    56652905257 HC PT THER PROC EA 15 MIN 3/16/2017 Mikaela Coyle PTA GP 1    50711578106 HC PT THER PROC GROUP 3/16/2017 Mikaela Coyle PTA GP 1          PT G-Codes  Outcome Measure Options: AM-PAC 6 Clicks Basic Mobility (PT)    Mikaela Coyle PTA  3/16/2017

## 2017-03-17 VITALS
TEMPERATURE: 97.7 F | WEIGHT: 172.13 LBS | BODY MASS INDEX: 29.39 KG/M2 | OXYGEN SATURATION: 94 % | HEIGHT: 64 IN | SYSTOLIC BLOOD PRESSURE: 171 MMHG | DIASTOLIC BLOOD PRESSURE: 93 MMHG | RESPIRATION RATE: 16 BRPM | HEART RATE: 89 BPM

## 2017-03-17 LAB
HCT VFR BLD AUTO: 36.5 % (ref 35.6–45.5)
HGB BLD-MCNC: 11.8 G/DL (ref 11.9–15.5)

## 2017-03-17 PROCEDURE — 85018 HEMOGLOBIN: CPT | Performed by: ORTHOPAEDIC SURGERY

## 2017-03-17 PROCEDURE — 97150 GROUP THERAPEUTIC PROCEDURES: CPT

## 2017-03-17 PROCEDURE — 97110 THERAPEUTIC EXERCISES: CPT

## 2017-03-17 PROCEDURE — 85014 HEMATOCRIT: CPT | Performed by: ORTHOPAEDIC SURGERY

## 2017-03-17 RX ORDER — PSEUDOEPHEDRINE HCL 30 MG
100 TABLET ORAL 2 TIMES DAILY PRN
Qty: 40 CAPSULE | Refills: 0 | Status: SHIPPED | OUTPATIENT
Start: 2017-03-17 | End: 2019-01-28

## 2017-03-17 RX ORDER — OXYCODONE AND ACETAMINOPHEN 7.5; 325 MG/1; MG/1
2 TABLET ORAL EVERY 4 HOURS PRN
Qty: 80 TABLET | Refills: 0 | Status: SHIPPED | OUTPATIENT
Start: 2017-03-17 | End: 2017-03-26

## 2017-03-17 RX ADMIN — ONDANSETRON 4 MG: 4 TABLET, FILM COATED ORAL at 05:19

## 2017-03-17 RX ADMIN — OXYCODONE HYDROCHLORIDE AND ACETAMINOPHEN 2 TABLET: 7.5; 325 TABLET ORAL at 08:10

## 2017-03-17 RX ADMIN — KETOTIFEN FUMARATE 1 DROP: 0.25 SOLUTION/ DROPS OPHTHALMIC at 05:19

## 2017-03-17 RX ADMIN — FERROUS SULFATE TAB 325 MG (65 MG ELEMENTAL FE) 325 MG: 325 (65 FE) TAB at 08:10

## 2017-03-17 RX ADMIN — ASPIRIN 325 MG: 325 TABLET, DELAYED RELEASE ORAL at 08:10

## 2017-03-17 RX ADMIN — CELECOXIB 200 MG: 200 CAPSULE ORAL at 08:10

## 2017-03-17 RX ADMIN — DOCUSATE SODIUM,SENNOSIDES 2 TABLET: 50; 8.6 TABLET, FILM COATED ORAL at 08:10

## 2017-03-17 RX ADMIN — OXYCODONE HYDROCHLORIDE AND ACETAMINOPHEN 2 TABLET: 7.5; 325 TABLET ORAL at 12:13

## 2017-03-17 RX ADMIN — OXYCODONE HYDROCHLORIDE AND ACETAMINOPHEN 2 TABLET: 7.5; 325 TABLET ORAL at 03:56

## 2017-03-17 NOTE — PLAN OF CARE
Problem: Inpatient Physical Therapy  Goal: Range of Motion Goal LTG- PT  Outcome: Unable to achieve outcome(s) by discharge Date Met:  03/17/17 03/17/17 1025   Range of Motion PT LTG   Range of Motion Goal PT LTG, Outcome goal not met   Reason Goal Not Met (ROM) PT LTG discharged from facility

## 2017-03-17 NOTE — PROGRESS NOTES
"Ortho POD 3    Patient Name:  Holly Bateman  YOB: 1960  Medical Records Number:  [id number]    No complaints    Visit Vitals   • /93 (BP Location: Left arm, Patient Position: Lying)   • Pulse 87   • Temp 97.7 °F (36.5 °C) (Oral)   • Resp 16   • Ht 64\" (162.6 cm)   • Wt 172 lb 2 oz (78.1 kg)   • SpO2 95%   • BMI 29.55 kg/m2       LLE:  NVI, calf nontender, sensation intact  No signs of DVT    Incision: clean, no infection    Lab Results (last 24 hours)     Procedure Component Value Units Date/Time    Hemoglobin & Hematocrit, Blood [[id number]]  (Abnormal) Collected:  03/17/17 0408    Specimen:  Blood Updated:  03/17/17 0455     Hemoglobin 11.8 (L) g/dL      Hematocrit 36.5 %           S/p Left TKA  WBAT with walker  ASA for DVT prophylaxis  Home with Home Health  "

## 2017-03-17 NOTE — THERAPY DISCHARGE NOTE
Acute Care - Physical Therapy Treatment Note/Discharge  [facility]     Patient Name: Holly Bateman  : 1960  MRN: [id number]  Today's Date: 3/17/2017             Admit Date: 3/14/2017    Visit Dx:    ICD-10-CM ICD-9-CM   1. Difficulty walking R26.2 719.7     Patient Active Problem List   Diagnosis   • OA (osteoarthritis) of knee       Physical Therapy Education     Title: PT OT SLP Therapies (Resolved)     Topic: Physical Therapy (Resolved)     Point: Mobility training (Resolved)    Learning Progress Summary    Learner Readiness Method Response Comment Documented by Status   Patient Acceptance E,TB,D [facility] 17 1015 Done    Acceptance E,TB [facility] 17 1155 Done    Acceptance E [facility] 03/15/17 0858 Done               Point: Home exercise program (Resolved)    Learning Progress Summary    Learner Readiness Method Response Comment Documented by Status   Patient Acceptance E,TB,D [facility] 17 1015 Done    Acceptance E,TB [facility] 17 1155 Done    Acceptance E [facility] 03/15/17 0858 Done               Point: Body mechanics (Resolved)    Learning Progress Summary    Learner Readiness Method Response Comment Documented by Status   Patient Acceptance E,TB,D [facility] 17 1015 Done    Acceptance E,TB [facility] 17 1155 Done               Point: Precautions (Resolved)    Learning Progress Summary    Learner Readiness Method Response Comment Documented by Status   Patient Acceptance E,TB,D [facility] 17 1015 Done    Acceptance E,TB [facility] 17 1155 Done                      User Key     Initials Effective Dates Name Provider Type Discipline     05/18/15 -  Jazmin Bingham, PT Physical Therapist PT     16 -  Mikaela Coyle PTA Physical Therapy Assistant PT                    IP PT Goals       17 1025 03/15/17 0859       Bed Mobility PT LTG    Bed Mobility PT LTG, Date Established  03/15/17  -     Bed Mobility PT LTG, Time to Achieve  3 days  -     Bed Mobility PT LTG,  Activity Type  all bed mobility  -KH     Bed Mobility PT LTG, [place] Level  independent  -KH     Bed Mobility PT LTG, Outcome goal met  -      Transfer Training PT LTG    Transfer Training PT LTG, Date Established  03/15/17  -KH     Transfer Training PT LTG, Time to Achieve  3 days  -KH     Transfer Training PT LTG, Activity Type  all transfers  -KH     Transfer Training PT LTG, [place] Level  supervision required  -KH     Transfer Training PT LTG, Assist Device  walker, rolling  -KH     Transfer Training PT LTG, Outcome goal met  -      Gait Training PT LTG    Gait Training Goal PT LTG, Date Established  03/15/17  -KH     Gait Training Goal PT LTG, Time to Achieve  3 days  -KH     Gait Training Goal PT LTG, [place] Level  supervision required  -KH     Gait Training Goal PT LTG, Assist Device  walker, rolling  -KH     Gait Training Goal PT LTG, Distance to Achieve  75 ft  -KH     Gait Training Goal PT LTG, Outcome goal met  -      Stair Training PT LTG    Stair Training Goal PT LTG, Date Established  03/15/17  -KH     Stair Training Goal PT LTG, Time to Achieve  3 days  -KH     Stair Training Goal PT LTG, Number of Steps  3  -KH     Stair Training Goal PT LTG, [place] Level  contact guard assist  -KH     Stair Training Goal PT LTG, Assist Device  1 handrail  -KH     Stair Training Goal PT LTG, Outcome goal met  -      Range of Motion PT LTG    Range of Motion Goal PT LTG, Date Established  03/15/17  -     Range of Motion Goal PT LTG, Time to Achieve  3 days  -KH     Range fo Motion Goal PT LTG, Joint  L knee  -KH     Range of Motion Goal PT LTG, AROM Measure  5-85  -KH     Range of Motion Goal PT LTG, Outcome goal not met  -      Reason Goal Not Met (ROM) PT LTG discharged from facility  -      Patient Education PT LTG    Patient Education PT LTG, Date Established  03/15/17  -     Patient Education PT LTG, Time to Achieve  3 days  -KH     Patient Education PT LTG, Education Type   HEP  -     Patient Education PT LTG, Education Understanding  demonstrate adequately  -     Patient Education PT LTG Outcome goal met  -        User Key  (r) = Recorded By, (t) = Taken By, (c) = Cosigned By    Initials Name Provider Type    CORRINA Bingham, PT Physical Therapist    STEVEN Coyle PTA Physical Therapy Assistant              Adult Rehabilitation Note       03/17/17 0922 03/16/17 1639 03/16/17 1152    Rehab Assessment/Intervention    Discipline physical therapy assistant  -STEEVN physical therapy assistant  -STEVEN physical therapy assistant  -STEVEN    Document Type discharge summary;therapy note (daily note)  - therapy note (daily note)  -STEVEN therapy note (daily note)  -STEVEN    Subjective Information agree to therapy;complains of;pain  - agree to therapy;complains of;weakness;fatigue;pain;swelling  - agree to therapy;complains of;pain  -JM    Symptoms Noted During/After Treatment  increased pain  -JM increased pain  -JM    Precautions/Limitations fall precautions  - fall precautions  - fall precautions  -JM    Recorded by [] Mikaela Coyle PTA [] Mikaela Coyle PTA [] Mikaela Coyle PTA    Pain Assessment    Pain Assessment 0-10  -JM 0-10  -JM 0-10  -JM    Pain Score 8  -JM 10  -JM 8  -JM    Post Pain Score   10  -JM    Pain Type Surgical pain  -JM Surgical pain  -JM Surgical pain  -JM    Pain Location Knee  -JM Knee  -JM Knee  -JM    Pain Orientation Left  -JM Left  -JM Left  -JM    Pain Intervention(s) Medication (See MAR);Repositioned;Cold applied  -JM Medication (See MAR);Repositioned;Cold applied  -JM Medication (See MAR);Repositioned;Cold applied  -JM    Response to Interventions  unchanged  -JM marissa , unchanged  -JM    Recorded by [STEVEN] Mikaela Coyle PTA [JM] Mikaela Coyle PTA [] Mikaela Coyle PTA    ROM (Range of Motion)    General ROM Detail -6-77 incr swelling limiting  -JM      Recorded by [STEVEN] Mikaela Coyle PTA      Bed Mobility,  Assessment/Treatment    Bed Mob, Supine to Sit, [place] conditional independence  - contact guard assist  - contact guard assist  -    Bed Mobility, Comment   due to incr pain  -    Recorded by [JM] Mikaela Coyle PTA [JM] Mikaela Coyle PTA [JM] Mikaela Coyle PTA    Transfer Assessment/Treatment    Transfers, Sit-Stand [place] stand by assist;verbal cues required  - stand by assist;verbal cues required  - stand by assist;verbal cues required  -    Transfers, Stand-Sit [place] stand by assist;verbal cues required  - stand by assist;verbal cues required  - stand by assist;verbal cues required  -    Transfers, Sit-Stand-Sit, Assist Device rolling walker  - rolling walker  - rolling walker  -    Recorded by [JM] Mikaela Coyle PTA [JM] Mikaela Coyle PTA [JM] Mikaela Coyle PTA    Gait Assessment/Treatment    Gait, [place] Level contact guard assist;stand by assist;verbal cues required  - contact guard assist;stand by assist;verbal cues required  - contact guard assist;stand by assist;verbal cues required  -    Gait, Assistive Device rolling walker  - rolling walker  -JM rolling walker  -    Gait, Distance (Feet) 120  -  -  -JM    Gait, Gait Deviations antalgic;zenobia decreased;step length decreased  - antalgic;zenobia decreased;forward flexed posture;decreased heel strike;step length decreased  - antalgic;zenobia decreased;step length decreased  -    Gait, Impairments pain  - pain  - pain  -    Recorded by [JM] Mikaela Coyle PTA [JM] Mikaela Coyle PTA [JM] Mikaela Coyle PTA    Stairs Assessment/Treatment    Number of Stairs 4  -JM 3  -JM 3  -JM    Stairs, Handrail Location right side (ascending)  - none  -JM none  -    Stairs, [place] Level contact guard assist;verbal cues required  - contact guard assist;verbal cues required  - contact guard assist;verbal cues required  -    Stairs, Assistive Device  walker  -JM walker  -JM walker  -    Stairs, Technique Used step to step (ascending);step to step (descending)  - step to step (ascending);step to step (descending)  - step to step (ascending);step to step (descending)  -    Stairs, Comment  friend present for educ  -JM backward w/supported wx  -JM    Recorded by [STEVEN] Mikaela Coyle PTA [JM] Mikaela Coyle PTA [JM] Mikaela Coyle PTA    Therapy Exercises    Exercise Protocols total knee  -JM total knee  -JM total knee  -    Total Knee Exercises left:;completed protocol;30 reps   perf most exer bilat due to rt le also needs sx  -JM left:;completed protocol;25 reps   perf most exer bilat due to rt le also needs sx  -JM left:;15 reps;completed protocol;20 reps   perf most exer bilat due to rt le also needs sx  -JM    Recorded by [STEVEN] Mikaela Coyle PTA [JM] Mikaela Coyle PTA [JM] Mikaela Coyle PTA    Positioning and Restraints    Pre-Treatment Position sitting in chair/recliner  - sitting in chair/recliner  - sitting in chair/recliner  -    Post Treatment Position bathroom  -JM      In Chair  reclined;call light within reach;encouraged to call for assist;with family/caregiver   friend present  - reclined;call light within reach;encouraged to call for assist  -    Bathroom call light within reach;encouraged to call for assist;notified nsg   nsg just outside pt room  -      Recorded by [STEVEN] Mikaela Coyle PTA [JM] Mikaela Coyle PTA [JM] Mikaela Coyle PTA      03/15/17 1600          Rehab Assessment/Intervention    Discipline physical therapy assistant  -      Document Type therapy note (daily note)  -      Subjective Information agree to therapy;complains of;pain  -      Symptoms Noted During/After Treatment increased pain  -      Precautions/Limitations fall precautions  -      Recorded by [STEVEN] Mikaela Coyle PTA      Pain Assessment    Pain Assessment 0-10  -      Pain Score 10  -JM      Post Pain Score 6  -JM       Pain Type Surgical pain  -JM      Pain Location Knee  -JM      Pain Orientation Left  -JM      Pain Intervention(s) Medication (See MAR);Repositioned;Ambulation/increased activity;Cold applied  -JM      Response to Interventions marissa  -JM      Recorded by [STEVEN] Mikaela Coyle PTA      ROM (Range of Motion)    General ROM Detail -  -JM      Recorded by [CHILO Coyle PTA      Bed Mobility, Assessment/Treatment    Bed Mobility, Comment in chair  -JM      Recorded by [CHILO Coyle PTA      Transfer Assessment/Treatment    Transfers, Sit-Stand [place] stand by assist;verbal cues required  -JM      Transfers, Stand-Sit [place] stand by assist;verbal cues required  -JM      Transfers, Sit-Stand-Sit, Assist Device rolling walker  -JM      Recorded by [CHILO Coyle PTA      Gait Assessment/Treatment    Gait, [place] Level contact guard assist;verbal cues required  -      Gait, Assistive Device rolling walker  -      Gait, Distance (Feet) 65  -JM      Gait, Gait Deviations antalgic;zenobia decreased;forward flexed posture;step length decreased  -      Gait, Safety Issues step length decreased  -JM      Gait, Impairments pain  -JM      Recorded by [CHILO Coyle PTA      Therapy Exercises    Exercise Protocols total knee  -JM      Total Knee Exercises left:;15 reps;completed protocol   perf most exer bilat due to rt le also needs sx  -JM      Recorded by [CHILO Coyle PTA      Positioning and Restraints    Pre-Treatment Position sitting in chair/recliner  -JM      In Chair reclined;with family/caregiver;encouraged to call for assist  -JM      Recorded by [CHILO Coyle PTA        User Key  (r) = Recorded By, (t) = Taken By, (c) = Cosigned By    Initials Name Effective Dates    STEVEN Coyle PTA 02/18/16 -           PT Recommendation and Plan  Anticipated Discharge Disposition: home with home health, skilled nursing facility  Planned Therapy  Interventions: bed mobility training, gait training, home exercise program, patient/family education, ROM (Range of Motion), strengthening, stair training, transfer training  PT Frequency: 2 times/day  Plan of Care Review  Plan Of Care Reviewed With: patient  Progress: improving  Outcome Summary/Follow up Plan: incr amb dist despite incr pain          Outcome Measures       03/17/17 1000 03/16/17 1100 03/15/17 0900    How much help from another person do you currently need...    Turning from your back to your side while in flat bed without using bedrails? 4  -JM 4  -JM 4  -KH    Moving from lying on back to sitting on the side of a flat bed without bedrails? 4  -JM 3  -JM 4  -KH    Moving to and from a bed to a chair (including a wheelchair)? 4  -JM 4  -JM 3  -KH    Standing up from a chair using your arms (e.g., wheelchair, bedside chair)? 4  -JM 3  -JM 3  -KH    Climbing 3-5 steps with a railing? 3  -JM 3  -JM 3  -KH    To walk in hospital room? 4  -JM 3  -JM 3  -KH    AM-PAC 6 Clicks Score 23  -JM 20  -JM 20  -KH    Functional Assessment    Outcome Measure Options   AM-PAC 6 Clicks Basic Mobility (PT)  -      User Key  (r) = Recorded By, (t) = Taken By, (c) = Cosigned By    Initials Name Provider Type     Jazmin Bignham, PT Physical Therapist    STEVEN Coyle PTA Physical Therapy Assistant           Time Calculation:         PT Charges       03/17/17 [facility]          Time Calculation    Start Time 0830  -      Stop Time 0925  -      Time Calculation (min) 55 min  -      PT Received On 03/17/17  -        User Key  (r) = Recorded By, (t) = Taken By, (c) = Cosigned By    Initials Name Provider Type    STEVEN Coyle PTA Physical Therapy Assistant          Therapy Charges for Today     Code Description Service Date Service Provider Modifiers Qty    34044391446 HC PT THER PROC GROUP 3/16/2017 Mikaela Coyle PTA GP 1    84986054609 HC PT THER PROC EA 15 MIN 3/16/2017 Mikaela Coyle PTA  GP 2    45701053572 HC PT THER PROC EA 15 MIN 3/16/2017 Mikaela Coyle, PTA GP 1    42536000556 HC PT THER PROC GROUP 3/16/2017 Mikaela Coyle, PTA GP 1    78981019326 HC PT THER PROC EA 15 MIN 3/17/2017 Mikaela Coyle, PTA GP 1    37529733760 HC PT THER PROC GROUP 3/17/2017 Mikaela Coyle, PTA GP 1          PT G-Codes  Outcome Measure Options: AM-PAC 6 Clicks Basic Mobility (PT)    PT Discharge Summary  Reason for Discharge: Discharge from facility  Outcomes Achieved: Refer to plan of care for updates on goals achieved  Discharge Destination: Home with assist, Home with home health (friend to help some)    Mikaela Coyle PTA  3/17/2017

## 2017-03-17 NOTE — PLAN OF CARE
Problem: Patient Care Overview (Adult)  Goal: Plan of Care Review  Outcome: Ongoing (interventions implemented as appropriate)    03/17/17 0324   Coping/Psychosocial Response Interventions   Plan Of Care Reviewed With patient   Patient Care Overview   Progress improving   Outcome Evaluation   Outcome Summary/Follow up Plan patient ambulating with assistance to bathroom and in hallway, pain controlled with oral pain medication at this time, plans to d/c 3/17/17, still deciding on home vs. rehab       Goal: Adult Individualization and Mutuality  Outcome: Ongoing (interventions implemented as appropriate)  Goal: Discharge Needs Assessment  Outcome: Ongoing (interventions implemented as appropriate)    Problem: Knee Replacement, Total (Adult)  Goal: Signs and Symptoms of Listed Potential Problems Will be Absent or Manageable (Knee Replacement, Total)  Outcome: Ongoing (interventions implemented as appropriate)    Problem: Fall Risk (Adult)  Goal: Absence of Falls  Outcome: Ongoing (interventions implemented as appropriate)

## 2017-03-17 NOTE — DISCHARGE SUMMARY
Discharge Summary    Patient Name:  Holly Bateman  YOB: 1960  Medical Records Number:  [id number]    Date of Admission:  3/14/2017  Date of Discharge:  3/17/2017    Primary Discharge Diagnosis:  OA (osteoarthritis) of knee [M17.9]    Secondary Discharge Diagnosis:    Problem List Items Addressed This Visit     None          Procedures Performed:  Left Total Knee Arthroplasty      Hospital Course:    Holly Bateman is a 56 y.o.  female who underwent successful left tka on 3/14/2017.  Holly Bateman was started on Aspirin 325mg po twice daily immediately post-operatively for DVT prophylaxis.  On post-op day 1 the patients dressing was changed and their incision was clean, with no signs of infection and their calf was soft, with no signs of DVT.  The patient progressed well with physical therapy and the patients hemoglobin remained stable. On post-operative day 3 the patient was felt ready for discharge.      Total Knee Joint Replacement Discharge Instructions:    I. ACTIVITIES:  1. Exercises:  ? Complete exercise program as taught by the hospital physical therapist 2 times per day  ? Exercise program will be advanced by the physical therapist  ? During the day be up ambulating every 2 hours (while awake) for short distances  ? Complete the ankle pump exercises at least 10 times per hour (while awake)  ? Elevate legs most of the day the first week post operatively and thereafter elevate legs when in bed and for at least 30 minutes during the day. Caution must be taken to avoid pillow placement under the bend of the knee as this can led to flexion contractures of the knee.  ? Use cold packs 20-30 minutes approximately 5 times per day. This should be done before and after completing your exercises and at any time you are experiencing pain/ stiffness in your operative extremity.      2. Activities of Daily Living:  ? No tub baths, hot tubs, or swimming pools for 4 weeks  ? May shower and let water run over  the incision on post-operative day #5 if no drainage. Do not scrub or rub the incision. Simply let the water run over the incision and pat dry.    II. Restrictions  ? Do not cross legs or kneel  ? Your surgeon will discuss with you when you will be able to drive again.  ? Weight bearing is as tolerated  ? First week stay inside on even terrain. May go up and down stairs one stair at a time utilizing the hand rail  ? After one week, you may venture outside.    III. Precautions:  ? Everyone that comes near you should wash their hands  ? No elective dental, genital-urinary, or colon procedures or surgical procedures for 12 weeks after surgery unless absolutely necessary.  ?  If dental work or surgical procedure is deemed absolutely necessary, you will need to contact your surgeon as you will need to take antibiotics 1 hour prior to any dental work (including teeth cleanings).  ? Please discuss with your surgeon prophylactic antibiotics as the length of time this intervention will be necessary for you varies with each patient’s health history and situation.  ? Avoid sick people. If you must be around someone who is ill, they should wear a mask.  ? Avoid visits to the Emergency Room or Urgent Care unless you are having a life threatening event.   ? If ordered stockings are to be placed on in the morning and removed at night. Monitor the stockings to ensure that any swelling is not causing the stockings to become too tight. In this case, remove stockings immediately.    IV. INCISION CARE:  ? Wash your hands prior to dressing changes  ? Change the dressing as needed to keep incision clean and dry. Utilize dry gauze and paper tape. Avoid touching the side of the gauze that goes against the incision with your hands.  ? No creams or ointments to the incision  ? May remove dressing once the incision is free of drainage  ? Do not touch or pick at the incision  ? Check incision every day and notify surgeon immediately if any of  the following signs or symptoms are noted:  o Increase in redness  o Increase in swelling around the incision and of the entire extremity  o Increase in pain  o Drainage oozing from the incision  o Pulling apart of the edges of the incision  o Increase in overall body temperature (greater than 100.5 degrees)  ? Your surgeon will instruct you regarding suture or staple removal    V. Medications:   1. Anticoagulants: You will be discharged on an anticoagulant. This is a prophylactic medication that helps prevent blood clots during your post-operative period. The type and length of dosage varies based on your individual needs, procedure performed, and surgeon’s preference.  ? While taking the anticoagulant, you should avoid taking any additional aspirin, ibuprofen (Advil or Motrin), Aleve (Naprosyn) or other non-steroidal anti-inflammatory medications.   ? Notify surgeon immediately if any pia bleeding is noted in the urine, stool, emesis, or from the nose or the incision. Blood in the stool will often appear as black rather than red. Blood in urine may appear as pink. Blood in emesis may appear as brown/black like coffee grounds.  ? You will need to apply pressure for longer periods of time to any cuts or abrasions to stop bleeding  ? Avoid alcohol while taking anticoagulants    2. Stool Softeners: You will be at greater risk of constipation after surgery due to being less mobile and the pain medications.   ? Take stool softeners as instructed by your surgeon while on pain medications. Over the counter Colace 100 mg 1-2 capsules twice daily.   ? If stools become too loose or too frequent, please decreases the dosage or stop the stool softener.  ? If constipation occurs despite use of stool softeners, you are to continue the stool softeners and add a laxative (Milk of Magnesia 1 ounce daily as needed)  ? Drink plenty of fluids, and eat fruits and vegetables during your recovery time    3. Pain Medications utilized  after surgery are narcotics and the law requires that the following information be given to all patients that are prescribed narcotics:  ? CLASSIFICATION: Pain medications are called Opioids and are narcotics  ? LEGALITIES: It is illegal to share narcotics with others and to drive within 24 hours of taking narcotics  ? POTENTIAL SIDE EFFECTS: Potential side effects of opioids include: nausea, vomiting, itching, dizziness, drowsiness, dry mouth, constipation, and difficulty urinating.  ? POTENTIAL ADVERSE EFFECTS:   o Opioid tolerance can develop with use of pain medications and this simply means that it requires more and more of the medication to control pain; however, this is seen more in patients that use opioids for longer periods of time.  o Opioid dependence can develop with use of Opioids and this simply means that to stop the medication can cause withdrawal symptoms; however, this is seen with patients that use Opioids for longer periods of time.  o Opioid addiction can develop with use of Opioids and the incidence of this is very unlikely in patients who take the medications as ordered and stop the medications as instructed.  o Opioid overdose can be dangerous, but is unlikely when the medication is taken as ordered and stopped when ordered. It is important not to mix opioids with alcohol or with and type of sedative such as Benadryl as this can lead to over sedation and respiratory difficulty.  ? DOSAGE:   o Pain medications will need to be taken consistently for the first week to decrease pain and promote adequate pain relief and participation in physical therapy.  o After the initial surgical pain begins to resolve, you may begin to decrease the pain medication. By the end of 6-8 weeks, you should be off of pain medications.  o Refills will not be given by the office during evening hours, on weekends, or after 6-8 weeks post-op.  o To seek refills on pain medications during the initial 6 week  post-operative period, you must call the office 48 hours in advance to request the refill. The office will then notify you when to  the prescription. DO NOT wait until you are out of the medication to request a refill.    V. FOLLOW-UP VISITS:  ? You will need to follow up in the office with your surgeon in 3 weeks. Please call this number [phone] to schedule this appointment.  If you have any concerns or suspected complications prior to your follow up visit, please call your surgeons office. Do not wait until your appointment time if you suspect complications. These will need to be addressed in the office promptly.      Discharge Medications:     1) Percocet 7.5/325 mg  1-2 po q 4-6 hours for pain control  2)  Aspirin 325 mg po twice daily for 2 weeks, then once daily for 4 weeks.    CC:Larry Madison Ryle, MD:Myron Easley MD

## 2017-03-17 NOTE — PLAN OF CARE
Problem: Inpatient Physical Therapy  Goal: Bed Mobility Goal LTG- PT  Outcome: Outcome(s) achieved Date Met:  03/17/17 03/17/17 1025   Bed Mobility PT LTG   Bed Mobility PT LTG, Outcome goal met       Goal: Transfer Training Goal 1 LTG- PT  Outcome: Outcome(s) achieved Date Met:  03/17/17 03/17/17 1025   Transfer Training PT LTG   Transfer Training PT LTG, Outcome goal met       Goal: Gait Training Goal LTG- PT  Outcome: Outcome(s) achieved Date Met:  03/17/17 03/17/17 1025   Gait Training PT LTG   Gait Training Goal PT LTG, Outcome goal met       Goal: Stair Training Goal LTG- PT  Outcome: Outcome(s) achieved Date Met:  03/17/17 03/17/17 1025   Stair Training PT LTG   Stair Training Goal PT LTG, Outcome goal met       Goal: Range of Motion Goal LTG- PT  Outcome: Unable to achieve outcome(s) by discharge Date Met:  03/17/17 03/17/17 1025   Range of Motion PT LTG   Range of Motion Goal PT LTG, Outcome goal met       Goal: Patient Education Goal LTG- PT  Outcome: Outcome(s) achieved Date Met:  03/17/17 03/17/17 1025   Patient Education PT LTG   Patient Education PT LTG Outcome goal met

## 2017-12-05 ENCOUNTER — APPOINTMENT (OUTPATIENT)
Dept: WOMENS IMAGING | Facility: HOSPITAL | Age: 57
End: 2017-12-05

## 2017-12-05 PROCEDURE — 77063 BREAST TOMOSYNTHESIS BI: CPT | Performed by: RADIOLOGY

## 2017-12-05 PROCEDURE — 77067 SCR MAMMO BI INCL CAD: CPT | Performed by: RADIOLOGY

## 2018-03-19 ENCOUNTER — TRANSCRIBE ORDERS (OUTPATIENT)
Dept: ADMINISTRATIVE | Facility: HOSPITAL | Age: 58
End: 2018-03-19

## 2018-03-19 DIAGNOSIS — G89.29 CHRONIC PAIN OF LEFT KNEE: Primary | ICD-10-CM

## 2018-03-19 DIAGNOSIS — M25.562 CHRONIC PAIN OF LEFT KNEE: Primary | ICD-10-CM

## 2018-03-26 ENCOUNTER — HOSPITAL ENCOUNTER (OUTPATIENT)
Dept: NUCLEAR MEDICINE | Facility: HOSPITAL | Age: 58
Discharge: HOME | End: 2018-03-26
Attending: ORTHOPAEDIC SURGERY

## 2018-03-26 DIAGNOSIS — M25.562 CHRONIC PAIN OF LEFT KNEE: ICD-10-CM

## 2018-03-26 DIAGNOSIS — G89.29 CHRONIC PAIN OF LEFT KNEE: ICD-10-CM

## 2018-03-26 PROCEDURE — 78315 BONE IMAGING 3 PHASE: CPT

## 2018-03-26 PROCEDURE — A9503 TC99M MEDRONATE: HCPCS | Performed by: ORTHOPAEDIC SURGERY

## 2018-03-26 PROCEDURE — 0 TECHNETIUM MEDRONATE KIT: Performed by: ORTHOPAEDIC SURGERY

## 2018-03-26 RX ORDER — TC 99M MEDRONATE 20 MG/10ML
21.1 INJECTION, POWDER, LYOPHILIZED, FOR SOLUTION INTRAVENOUS
Status: COMPLETED | OUTPATIENT
Start: 2018-03-26 | End: 2018-03-26

## 2018-03-26 RX ADMIN — Medication 21.1 MILLICURIE: at 08:15

## 2019-01-04 ENCOUNTER — APPOINTMENT (OUTPATIENT)
Dept: PREADMISSION TESTING | Facility: HOSPITAL | Age: 59
End: 2019-01-04

## 2019-01-28 ENCOUNTER — HOSPITAL ENCOUNTER (OUTPATIENT)
Dept: GENERAL RADIOLOGY | Facility: HOSPITAL | Age: 59
Discharge: HOME | End: 2019-01-28
Admitting: ORTHOPAEDIC SURGERY

## 2019-01-28 ENCOUNTER — APPOINTMENT (OUTPATIENT)
Dept: PREADMISSION TESTING | Facility: HOSPITAL | Age: 59
End: 2019-01-28

## 2019-01-28 ENCOUNTER — HOSPITAL ENCOUNTER (OUTPATIENT)
Dept: GENERAL RADIOLOGY | Facility: HOSPITAL | Age: 59
Discharge: HOME | End: 2019-01-28

## 2019-01-28 VITALS
SYSTOLIC BLOOD PRESSURE: 151 MMHG | HEART RATE: 75 BPM | RESPIRATION RATE: 18 BRPM | HEIGHT: 64 IN | TEMPERATURE: 97.4 F | WEIGHT: 171 LBS | DIASTOLIC BLOOD PRESSURE: 91 MMHG | BODY MASS INDEX: 29.19 KG/M2 | OXYGEN SATURATION: 95 %

## 2019-01-28 LAB
ALBUMIN SERPL-MCNC: 4.4 G/DL (ref 3.5–5.2)
ALBUMIN/GLOB SERPL: 1.8 G/DL
ALP SERPL-CCNC: 44 U/L (ref 39–117)
ALT SERPL W P-5'-P-CCNC: 18 U/L (ref 1–33)
ANION GAP SERPL CALCULATED.3IONS-SCNC: 13.4 MMOL/L
AST SERPL-CCNC: 14 U/L (ref 1–32)
BACTERIA UR QL AUTO: NORMAL /HPF
BILIRUB SERPL-MCNC: 0.4 MG/DL (ref 0.1–1.2)
BILIRUB UR QL STRIP: NEGATIVE
BUN BLD-MCNC: 19 MG/DL (ref 6–20)
BUN/CREAT SERPL: 36.5 (ref 7–25)
CALCIUM SPEC-SCNC: 9.9 MG/DL (ref 8.6–10.5)
CHLORIDE SERPL-SCNC: 102 MMOL/L (ref 98–107)
CLARITY UR: CLEAR
CO2 SERPL-SCNC: 23.6 MMOL/L (ref 22–29)
COLOR UR: YELLOW
CREAT BLD-MCNC: 0.52 MG/DL (ref 0.57–1)
CRP SERPL-MCNC: 0.22 MG/DL (ref 0–0.5)
DEPRECATED RDW RBC AUTO: 42.8 FL (ref 37–54)
ERYTHROCYTE [DISTWIDTH] IN BLOOD BY AUTOMATED COUNT: 12.9 % (ref 11.7–13)
ERYTHROCYTE [SEDIMENTATION RATE] IN BLOOD: 7 MM/HR (ref 0–30)
GFR SERPL CREATININE-BSD FRML MDRD: 122 ML/MIN/1.73
GLOBULIN UR ELPH-MCNC: 2.4 GM/DL
GLUCOSE BLD-MCNC: 94 MG/DL (ref 65–99)
GLUCOSE UR STRIP-MCNC: NEGATIVE MG/DL
HCT VFR BLD AUTO: 43.6 % (ref 35.6–45.5)
HGB BLD-MCNC: 14.5 G/DL (ref 11.9–15.5)
HGB UR QL STRIP.AUTO: NEGATIVE
HYALINE CASTS UR QL AUTO: NORMAL /LPF
INR PPP: 1.03 (ref 0.9–1.1)
KETONES UR QL STRIP: NEGATIVE
LEUKOCYTE ESTERASE UR QL STRIP.AUTO: NEGATIVE
MCH RBC QN AUTO: 30.2 PG (ref 26.9–32)
MCHC RBC AUTO-ENTMCNC: 33.3 G/DL (ref 32.4–36.3)
MCV RBC AUTO: 90.8 FL (ref 80.5–98.2)
NITRITE UR QL STRIP: NEGATIVE
PH UR STRIP.AUTO: <=5 [PH] (ref 5–8)
PLATELET # BLD AUTO: 232 10*3/MM3 (ref 140–500)
PMV BLD AUTO: 10.7 FL (ref 6–12)
POTASSIUM BLD-SCNC: 4.1 MMOL/L (ref 3.5–5.2)
PROT SERPL-MCNC: 6.8 G/DL (ref 6–8.5)
PROT UR QL STRIP: NEGATIVE
PROTHROMBIN TIME: 13.3 SECONDS (ref 11.7–14.2)
RBC # BLD AUTO: 4.8 10*6/MM3 (ref 3.9–5.2)
RBC # UR: NORMAL /HPF
REF LAB TEST METHOD: NORMAL
SODIUM BLD-SCNC: 139 MMOL/L (ref 136–145)
SP GR UR STRIP: 1.02 (ref 1–1.03)
SQUAMOUS #/AREA URNS HPF: NORMAL /HPF
UROBILINOGEN UR QL STRIP: NORMAL
WBC NRBC COR # BLD: 6.6 10*3/MM3 (ref 4.5–10.7)
WBC UR QL AUTO: NORMAL /HPF

## 2019-01-28 PROCEDURE — 71046 X-RAY EXAM CHEST 2 VIEWS: CPT

## 2019-01-28 PROCEDURE — 93005 ELECTROCARDIOGRAM TRACING: CPT

## 2019-01-28 PROCEDURE — 93010 ELECTROCARDIOGRAM REPORT: CPT | Performed by: INTERNAL MEDICINE

## 2019-01-28 PROCEDURE — 86140 C-REACTIVE PROTEIN: CPT | Performed by: ORTHOPAEDIC SURGERY

## 2019-01-28 PROCEDURE — 36415 COLL VENOUS BLD VENIPUNCTURE: CPT

## 2019-01-28 PROCEDURE — 81001 URINALYSIS AUTO W/SCOPE: CPT | Performed by: ORTHOPAEDIC SURGERY

## 2019-01-28 PROCEDURE — 85652 RBC SED RATE AUTOMATED: CPT | Performed by: ORTHOPAEDIC SURGERY

## 2019-01-28 PROCEDURE — 80053 COMPREHEN METABOLIC PANEL: CPT | Performed by: ORTHOPAEDIC SURGERY

## 2019-01-28 PROCEDURE — 85610 PROTHROMBIN TIME: CPT | Performed by: ORTHOPAEDIC SURGERY

## 2019-01-28 PROCEDURE — 85027 COMPLETE CBC AUTOMATED: CPT | Performed by: ORTHOPAEDIC SURGERY

## 2019-01-28 PROCEDURE — 73560 X-RAY EXAM OF KNEE 1 OR 2: CPT

## 2019-01-28 RX ORDER — DICLOFENAC SODIUM 75 MG/1
75 TABLET, DELAYED RELEASE ORAL 2 TIMES DAILY
COMMUNITY
End: 2019-02-09 | Stop reason: HOSPADM

## 2019-01-28 RX ORDER — CHLORHEXIDINE GLUCONATE 500 MG/1
CLOTH TOPICAL TAKE AS DIRECTED
COMMUNITY
End: 2019-02-09 | Stop reason: HOSPADM

## 2019-01-28 RX ORDER — ESTRADIOL 0.1 MG/D
1 FILM, EXTENDED RELEASE TRANSDERMAL 2 TIMES WEEKLY
COMMUNITY

## 2019-01-28 RX ORDER — HYDROCODONE BITARTRATE AND ACETAMINOPHEN 10; 325 MG/1; MG/1
1 TABLET ORAL EVERY 6 HOURS PRN
COMMUNITY
End: 2019-02-09 | Stop reason: HOSPADM

## 2019-01-28 ASSESSMENT — KOOS JR
KOOS JR SCORE: 54.84
KOOS JR SCORE: 13

## 2019-01-28 NOTE — DISCHARGE INSTRUCTIONS
Take the following medications the morning of surgery with a small sip of water: LOSARTAN     ARRIVE AT 1200    General Instructions:  • Do not eat solid food after midnight the night before surgery.  • You may drink clear liquids day of surgery but must stop at least one hour before your hospital arrival time.  • It is beneficial for you to have a clear drink that contains carbohydrates the day of surgery.  We suggest a 12 to 20 ounce bottle of Gatorade or Powerade for non-diabetic patients or a 12 to 20 ounce bottle of G2 or Powerade Zero for diabetic patients. (Pediatric patients, are not advised to drink a 12 to 20 ounce carbohydrate drink)    Clear liquids are liquids you can see through.  Nothing red in color.     Plain water                               Sports drinks  Sodas                                   Gelatin (Jell-O)  Fruit juices without pulp such as white grape juice and apple juice  Popsicles that contain no fruit or yogurt  Tea or coffee (no cream or milk added)  Gatorade / Powerade  G2 / Powerade Zero    • Infants may have breast milk up to four hours before surgery.  • Infants drinking formula may drink formula up to six hours before surgery.   • Patients who avoid smoking, chewing tobacco and alcohol for 4 weeks prior to surgery have a reduced risk of post-operative complications.  Quit smoking as many days before surgery as you can.  • Do not smoke, use chewing tobacco or drink alcohol the day of surgery.   • If applicable bring your C-PAP/ BI-PAP machine.  • Bring any papers given to you in the doctor’s office.  • Wear clean comfortable clothes and socks.  • Do not wear contact lenses or make-up.  Bring a case for your glasses.   • Bring crutches or walker if applicable.  • Remove all piercings.  Leave jewelry and any other valuables at home.  • Hair extensions with metal clips must be removed prior to surgery.  • The Pre-Admission Testing nurse will instruct you to bring medications if  unable to obtain an accurate list in Pre-Admission Testing.        Preventing a Surgical Site Infection:  • For 2 to 3 days before surgery, avoid shaving with a razor because the razor can irritate skin and make it easier to develop an infection.    • Any areas of open skin can increase the risk of a post-operative wound infection by allowing bacteria to enter and travel throughout the body.  Notify your surgeon if you have any skin wounds / rashes even if it is not near the expected surgical site.  The area will need assessed to determine if surgery should be delayed until it is healed.  • The night prior to surgery sleep in a clean bed with clean clothing.  Do not allow pets to sleep with you.  • Shower on the morning of surgery using a fresh bar of anti-bacterial soap (such as Dial) and clean washcloth.  Dry with a clean towel and dress in clean clothing.  • Ask your surgeon if you will be receiving antibiotics prior to surgery.  • Make sure you, your family, and all healthcare providers clean their hands with soap and water or an alcohol based hand  before caring for you or your wound.    Day of surgery:  Upon arrival, a Pre-op nurse and Anesthesiologist will review your health history, obtain vital signs, and answer questions you may have.  The only belongings needed at this time will be your home medications and if applicable your C-PAP/BI-PAP machine.  If you are staying overnight your family can leave the rest of your belongings in the car and bring them to your room later.  A Pre-op nurse will start an IV and you may receive medication in preparation for surgery, including something to help you relax.  Your family will be able to see you in the Pre-op area.  While you are in surgery your family should notify the waiting room  if they leave the waiting room area and provide a contact phone number.    Please be aware that surgery does come with discomfort.  We want to make every effort to  control your discomfort so please discuss any uncontrolled symptoms with your nurse.   Your doctor will most likely have prescribed pain medications.      If you are going home after surgery you will receive individualized written care instructions before being discharged.  A responsible adult must drive you to and from the hospital on the day of your surgery and stay with you for 24 hours.    If you are staying overnight following surgery, you will be transported to your hospital room following the recovery period.  [facility] has all private rooms.    You have received a list of surgical assistants for your reference.  If you have any questions please call Pre-Admission Testing at [phone].  Deductibles and co-payments are collected on the day of service. Please be prepared to pay the required co-pay, deductible or deposit on the day of service as defined by your plan.    2% CHLORAHEXIDINE GLUCONATE* CLOTH  Preparing or “prepping” skin before surgery can reduce the risk of infection at the surgical site. To make the process easier, [facility] has chosen disposable cloths moistened with a rinse-free, 2% Chlorhexidine Gluconate (CHG) antiseptic solution. The steps below outline the prepping process and should be carefully followed.        Use the prep cloth on the area that is circled in the diagram             Directions Night before Surgery  1) Shower using a fresh bar of anti-bacterial soap (such as Dial) and clean washcloth.  Use a clean towel to completely dry your skin.  2) Do not use any lotions, oils or creams on your skin.  3) Open the package and remove 1 cloth, wipe your skin for 30 seconds in a circular motion.  Allow to dry for 3 minutes.  4) Repeat #3 with second cloth.  5) Do not touch your eyes, ears, or mouth with the prep cloth.  6) Allow the wet prep solution to air dry.  7) Discard the prep cloth and wash your hands with soap and water.   8) Dress in clean bed  clothes and sleep on fresh clean bed sheets.   9) You may experience some temporary itching after the prep.    Directions Day of Surgery  1) Repeat steps 1,2,3,4,5,6,7, and 9.   2) Dress in clean clothes before coming to the hospital.    BACTROBAN NASAL OINTMENT  There are many germs normally in your nose. Bactroban is an ointment that will help reduce these germs. Please follow these instructions for Bactroban use:      ____The day before surgery in the morning  Date________    ____The day before surgery in the evening              Date________    ____The day of surgery in the morning    Date________    **Squirt ½ package of Bactroban Ointment onto a cotton applicator and apply to inside of 1st nostril.  Squirt the remaining Bactroban and apply to the inside of the other nostril.

## 2019-02-07 ENCOUNTER — ANESTHESIA (OUTPATIENT)
Dept: PERIOP | Facility: HOSPITAL | Age: 59
End: 2019-02-07

## 2019-02-07 ENCOUNTER — ANESTHESIA EVENT (OUTPATIENT)
Dept: PERIOP | Facility: HOSPITAL | Age: 59
End: 2019-02-07

## 2019-02-07 ENCOUNTER — APPOINTMENT (OUTPATIENT)
Dept: GENERAL RADIOLOGY | Facility: HOSPITAL | Age: 59
End: 2019-02-07

## 2019-02-07 ENCOUNTER — HOSPITAL ENCOUNTER (INPATIENT)
Facility: HOSPITAL | Age: 59
LOS: 2 days | Discharge: HOME | End: 2019-02-09
Attending: ORTHOPAEDIC SURGERY | Admitting: ORTHOPAEDIC SURGERY

## 2019-02-07 DIAGNOSIS — T84.018A FAILED TOTAL KNEE ARTHROPLASTY (HCC): ICD-10-CM

## 2019-02-07 DIAGNOSIS — Z96.659 FAILED TOTAL KNEE ARTHROPLASTY (HCC): ICD-10-CM

## 2019-02-07 DIAGNOSIS — R26.2 DIFFICULTY WALKING: Primary | ICD-10-CM

## 2019-02-07 PROCEDURE — C1776 JOINT DEVICE (IMPLANTABLE): HCPCS | Performed by: ORTHOPAEDIC SURGERY

## 2019-02-07 PROCEDURE — 25010000002 ROPIVACAINE PER 1 MG: Performed by: ANESTHESIOLOGY

## 2019-02-07 PROCEDURE — 25010000002 DEXAMETHASONE PER 1 MG: Performed by: NURSE ANESTHETIST, CERTIFIED REGISTERED

## 2019-02-07 PROCEDURE — 25010000002 ONDANSETRON PER 1 MG: Performed by: NURSE ANESTHETIST, CERTIFIED REGISTERED

## 2019-02-07 PROCEDURE — 87205 SMEAR GRAM STAIN: CPT | Performed by: ORTHOPAEDIC SURGERY

## 2019-02-07 PROCEDURE — C1713 ANCHOR/SCREW BN/BN,TIS/BN: HCPCS | Performed by: ORTHOPAEDIC SURGERY

## 2019-02-07 PROCEDURE — 0SRW0J9 REPLACEMENT OF LEFT KNEE JOINT, TIBIAL SURFACE WITH SYNTHETIC SUBSTITUTE, CEMENTED, OPEN APPROACH: ICD-10-PCS | Performed by: ORTHOPAEDIC SURGERY

## 2019-02-07 PROCEDURE — 25010000002 HYDROMORPHONE PER 4 MG: Performed by: NURSE ANESTHETIST, CERTIFIED REGISTERED

## 2019-02-07 PROCEDURE — 25010000002 FENTANYL CITRATE (PF) 100 MCG/2ML SOLUTION: Performed by: NURSE ANESTHETIST, CERTIFIED REGISTERED

## 2019-02-07 PROCEDURE — 25010000002 ROPIVACAINE PER 1 MG: Performed by: ORTHOPAEDIC SURGERY

## 2019-02-07 PROCEDURE — 0SPU0JZ REMOVAL OF SYNTHETIC SUBSTITUTE FROM LEFT KNEE JOINT, FEMORAL SURFACE, OPEN APPROACH: ICD-10-PCS | Performed by: ORTHOPAEDIC SURGERY

## 2019-02-07 PROCEDURE — 25010000002 PROPOFOL 10 MG/ML EMULSION: Performed by: NURSE ANESTHETIST, CERTIFIED REGISTERED

## 2019-02-07 PROCEDURE — 25010000002 MIDAZOLAM PER 1 MG: Performed by: ANESTHESIOLOGY

## 2019-02-07 PROCEDURE — 73560 X-RAY EXAM OF KNEE 1 OR 2: CPT

## 2019-02-07 PROCEDURE — 0SRU0J9 REPLACEMENT OF LEFT KNEE JOINT, FEMORAL SURFACE WITH SYNTHETIC SUBSTITUTE, CEMENTED, OPEN APPROACH: ICD-10-PCS | Performed by: ORTHOPAEDIC SURGERY

## 2019-02-07 PROCEDURE — 87075 CULTR BACTERIA EXCEPT BLOOD: CPT | Performed by: ORTHOPAEDIC SURGERY

## 2019-02-07 PROCEDURE — 25010000002 KETOROLAC TROMETHAMINE PER 15 MG: Performed by: ORTHOPAEDIC SURGERY

## 2019-02-07 PROCEDURE — 87070 CULTURE OTHR SPECIMN AEROBIC: CPT | Performed by: ORTHOPAEDIC SURGERY

## 2019-02-07 PROCEDURE — 25010000002 FENTANYL CITRATE (PF) 100 MCG/2ML SOLUTION: Performed by: ANESTHESIOLOGY

## 2019-02-07 PROCEDURE — 0SPW0JZ REMOVAL OF SYNTHETIC SUBSTITUTE FROM LEFT KNEE JOINT, TIBIAL SURFACE, OPEN APPROACH: ICD-10-PCS | Performed by: ORTHOPAEDIC SURGERY

## 2019-02-07 PROCEDURE — 25010000003 CEFAZOLIN IN DEXTROSE 2-4 GM/100ML-% SOLUTION: Performed by: ORTHOPAEDIC SURGERY

## 2019-02-07 DEVICE — IMPLANTABLE DEVICE: Type: IMPLANTABLE DEVICE | Site: KNEE | Status: FUNCTIONAL

## 2019-02-07 DEVICE — CMT BONE PALACOS R HI/VISC 1X40: Type: IMPLANTABLE DEVICE | Site: KNEE | Status: FUNCTIONAL

## 2019-02-07 RX ORDER — DOCUSATE SODIUM 100 MG/1
100 CAPSULE, LIQUID FILLED ORAL 2 TIMES DAILY PRN
Status: DISCONTINUED | OUTPATIENT
Start: 2019-02-07 | End: 2019-02-09 | Stop reason: HOSPADM

## 2019-02-07 RX ORDER — MAGNESIUM HYDROXIDE 1200 MG/15ML
LIQUID ORAL AS NEEDED
Status: DISCONTINUED | OUTPATIENT
Start: 2019-02-07 | End: 2019-02-07 | Stop reason: HOSPADM

## 2019-02-07 RX ORDER — ROCURONIUM BROMIDE 10 MG/ML
INJECTION, SOLUTION INTRAVENOUS AS NEEDED
Status: DISCONTINUED | OUTPATIENT
Start: 2019-02-07 | End: 2019-02-07 | Stop reason: SURG

## 2019-02-07 RX ORDER — LIDOCAINE HYDROCHLORIDE 10 MG/ML
0.5 INJECTION, SOLUTION EPIDURAL; INFILTRATION; INTRACAUDAL; PERINEURAL ONCE AS NEEDED
Status: DISCONTINUED | OUTPATIENT
Start: 2019-02-07 | End: 2019-02-07 | Stop reason: HOSPADM

## 2019-02-07 RX ORDER — CEFAZOLIN SODIUM 2 G/100ML
2 INJECTION, SOLUTION INTRAVENOUS ONCE
Status: COMPLETED | OUTPATIENT
Start: 2019-02-07 | End: 2019-02-07

## 2019-02-07 RX ORDER — PROMETHAZINE HYDROCHLORIDE 25 MG/ML
12.5 INJECTION, SOLUTION INTRAMUSCULAR; INTRAVENOUS ONCE AS NEEDED
Status: DISCONTINUED | OUTPATIENT
Start: 2019-02-07 | End: 2019-02-07

## 2019-02-07 RX ORDER — FENTANYL CITRATE 50 UG/ML
50 INJECTION, SOLUTION INTRAMUSCULAR; INTRAVENOUS
Status: DISCONTINUED | OUTPATIENT
Start: 2019-02-07 | End: 2019-02-07 | Stop reason: HOSPADM

## 2019-02-07 RX ORDER — NALOXONE HCL 0.4 MG/ML
0.4 VIAL (ML) INJECTION
Status: DISCONTINUED | OUTPATIENT
Start: 2019-02-07 | End: 2019-02-09 | Stop reason: HOSPADM

## 2019-02-07 RX ORDER — HYDROCHLOROTHIAZIDE 12.5 MG/1
12.5 CAPSULE, GELATIN COATED ORAL DAILY
Status: DISCONTINUED | OUTPATIENT
Start: 2019-02-08 | End: 2019-02-09 | Stop reason: HOSPADM

## 2019-02-07 RX ORDER — CHOLECALCIFEROL (VITAMIN D3) 125 MCG
5 CAPSULE ORAL NIGHTLY PRN
Status: DISCONTINUED | OUTPATIENT
Start: 2019-02-07 | End: 2019-02-09 | Stop reason: HOSPADM

## 2019-02-07 RX ORDER — CLINDAMYCIN PHOSPHATE 900 MG/50ML
900 INJECTION INTRAVENOUS EVERY 8 HOURS
Status: COMPLETED | OUTPATIENT
Start: 2019-02-07 | End: 2019-02-08

## 2019-02-07 RX ORDER — ACETAMINOPHEN 500 MG
1000 TABLET ORAL ONCE
Status: COMPLETED | OUTPATIENT
Start: 2019-02-07 | End: 2019-02-07

## 2019-02-07 RX ORDER — OXYCODONE AND ACETAMINOPHEN 7.5; 325 MG/1; MG/1
2 TABLET ORAL EVERY 4 HOURS PRN
Status: DISCONTINUED | OUTPATIENT
Start: 2019-02-07 | End: 2019-02-08

## 2019-02-07 RX ORDER — OXYCODONE AND ACETAMINOPHEN 7.5; 325 MG/1; MG/1
1 TABLET ORAL EVERY 4 HOURS PRN
Status: DISCONTINUED | OUTPATIENT
Start: 2019-02-07 | End: 2019-02-08

## 2019-02-07 RX ORDER — ONDANSETRON 2 MG/ML
4 INJECTION INTRAMUSCULAR; INTRAVENOUS ONCE AS NEEDED
Status: DISCONTINUED | OUTPATIENT
Start: 2019-02-07 | End: 2019-02-07

## 2019-02-07 RX ORDER — PROMETHAZINE HYDROCHLORIDE 25 MG/1
25 SUPPOSITORY RECTAL ONCE AS NEEDED
Status: DISCONTINUED | OUTPATIENT
Start: 2019-02-07 | End: 2019-02-07

## 2019-02-07 RX ORDER — ONDANSETRON 2 MG/ML
4 INJECTION INTRAMUSCULAR; INTRAVENOUS EVERY 6 HOURS PRN
Status: DISCONTINUED | OUTPATIENT
Start: 2019-02-07 | End: 2019-02-09 | Stop reason: HOSPADM

## 2019-02-07 RX ORDER — ONDANSETRON 2 MG/ML
INJECTION INTRAMUSCULAR; INTRAVENOUS AS NEEDED
Status: DISCONTINUED | OUTPATIENT
Start: 2019-02-07 | End: 2019-02-07 | Stop reason: SURG

## 2019-02-07 RX ORDER — MIDAZOLAM HYDROCHLORIDE 1 MG/ML
2 INJECTION INTRAMUSCULAR; INTRAVENOUS
Status: DISCONTINUED | OUTPATIENT
Start: 2019-02-07 | End: 2019-02-07 | Stop reason: HOSPADM

## 2019-02-07 RX ORDER — FENTANYL CITRATE 50 UG/ML
50 INJECTION, SOLUTION INTRAMUSCULAR; INTRAVENOUS
Status: DISCONTINUED | OUTPATIENT
Start: 2019-02-07 | End: 2019-02-07

## 2019-02-07 RX ORDER — SODIUM CHLORIDE, SODIUM LACTATE, POTASSIUM CHLORIDE, CALCIUM CHLORIDE 600; 310; 30; 20 MG/100ML; MG/100ML; MG/100ML; MG/100ML
9 INJECTION, SOLUTION INTRAVENOUS CONTINUOUS
Status: DISCONTINUED | OUTPATIENT
Start: 2019-02-07 | End: 2019-02-09 | Stop reason: HOSPADM

## 2019-02-07 RX ORDER — BISACODYL 10 MG
10 SUPPOSITORY, RECTAL RECTAL DAILY PRN
Status: DISCONTINUED | OUTPATIENT
Start: 2019-02-07 | End: 2019-02-09 | Stop reason: HOSPADM

## 2019-02-07 RX ORDER — LIDOCAINE HYDROCHLORIDE 20 MG/ML
INJECTION, SOLUTION INFILTRATION; PERINEURAL AS NEEDED
Status: DISCONTINUED | OUTPATIENT
Start: 2019-02-07 | End: 2019-02-07 | Stop reason: SURG

## 2019-02-07 RX ORDER — SODIUM CHLORIDE 0.9 % (FLUSH) 0.9 %
3 SYRINGE (ML) INJECTION EVERY 12 HOURS SCHEDULED
Status: DISCONTINUED | OUTPATIENT
Start: 2019-02-07 | End: 2019-02-09 | Stop reason: HOSPADM

## 2019-02-07 RX ORDER — SENNA AND DOCUSATE SODIUM 50; 8.6 MG/1; MG/1
2 TABLET, FILM COATED ORAL 2 TIMES DAILY PRN
Status: DISCONTINUED | OUTPATIENT
Start: 2019-02-07 | End: 2019-02-09 | Stop reason: HOSPADM

## 2019-02-07 RX ORDER — ROPIVACAINE HYDROCHLORIDE 5 MG/ML
INJECTION, SOLUTION EPIDURAL; INFILTRATION; PERINEURAL
Status: COMPLETED | OUTPATIENT
Start: 2019-02-07 | End: 2019-02-07

## 2019-02-07 RX ORDER — TRANEXAMIC ACID 100 MG/ML
INJECTION, SOLUTION INTRAVENOUS AS NEEDED
Status: DISCONTINUED | OUTPATIENT
Start: 2019-02-07 | End: 2019-02-07 | Stop reason: SURG

## 2019-02-07 RX ORDER — FERROUS SULFATE 325(65) MG
325 TABLET ORAL
Status: DISCONTINUED | OUTPATIENT
Start: 2019-02-08 | End: 2019-02-09 | Stop reason: HOSPADM

## 2019-02-07 RX ORDER — CELECOXIB 200 MG/1
200 CAPSULE ORAL ONCE
Status: COMPLETED | OUTPATIENT
Start: 2019-02-07 | End: 2019-02-07

## 2019-02-07 RX ORDER — ONDANSETRON 4 MG/1
4 TABLET, FILM COATED ORAL EVERY 6 HOURS PRN
Status: DISCONTINUED | OUTPATIENT
Start: 2019-02-07 | End: 2019-02-09 | Stop reason: HOSPADM

## 2019-02-07 RX ORDER — SODIUM CHLORIDE 0.9 % (FLUSH) 0.9 %
1-10 SYRINGE (ML) INJECTION AS NEEDED
Status: DISCONTINUED | OUTPATIENT
Start: 2019-02-07 | End: 2019-02-07 | Stop reason: HOSPADM

## 2019-02-07 RX ORDER — HYDROMORPHONE HYDROCHLORIDE 1 MG/ML
0.5 INJECTION, SOLUTION INTRAMUSCULAR; INTRAVENOUS; SUBCUTANEOUS
Status: DISCONTINUED | OUTPATIENT
Start: 2019-02-07 | End: 2019-02-07

## 2019-02-07 RX ORDER — MIDAZOLAM HYDROCHLORIDE 1 MG/ML
1 INJECTION INTRAMUSCULAR; INTRAVENOUS
Status: DISCONTINUED | OUTPATIENT
Start: 2019-02-07 | End: 2019-02-07 | Stop reason: HOSPADM

## 2019-02-07 RX ORDER — EPHEDRINE SULFATE 50 MG/ML
5 INJECTION, SOLUTION INTRAVENOUS ONCE AS NEEDED
Status: DISCONTINUED | OUTPATIENT
Start: 2019-02-07 | End: 2019-02-07

## 2019-02-07 RX ORDER — KETOTIFEN FUMARATE 0.35 MG/ML
1 SOLUTION/ DROPS OPHTHALMIC 2 TIMES DAILY PRN
Status: DISCONTINUED | OUTPATIENT
Start: 2019-02-07 | End: 2019-02-09 | Stop reason: HOSPADM

## 2019-02-07 RX ORDER — DIAZEPAM 5 MG/1
5 TABLET ORAL 2 TIMES DAILY PRN
Status: DISCONTINUED | OUTPATIENT
Start: 2019-02-07 | End: 2019-02-09 | Stop reason: HOSPADM

## 2019-02-07 RX ORDER — FAMOTIDINE 10 MG/ML
20 INJECTION, SOLUTION INTRAVENOUS ONCE
Status: COMPLETED | OUTPATIENT
Start: 2019-02-07 | End: 2019-02-07

## 2019-02-07 RX ORDER — ACETAMINOPHEN 325 MG/1
650 TABLET ORAL ONCE AS NEEDED
Status: DISCONTINUED | OUTPATIENT
Start: 2019-02-07 | End: 2019-02-07

## 2019-02-07 RX ORDER — PROPOFOL 10 MG/ML
VIAL (ML) INTRAVENOUS AS NEEDED
Status: DISCONTINUED | OUTPATIENT
Start: 2019-02-07 | End: 2019-02-07 | Stop reason: SURG

## 2019-02-07 RX ORDER — DIPHENHYDRAMINE HYDROCHLORIDE 50 MG/ML
12.5 INJECTION INTRAMUSCULAR; INTRAVENOUS
Status: DISCONTINUED | OUTPATIENT
Start: 2019-02-07 | End: 2019-02-07

## 2019-02-07 RX ORDER — HYDROMORPHONE HCL 110MG/55ML
PATIENT CONTROLLED ANALGESIA SYRINGE INTRAVENOUS AS NEEDED
Status: DISCONTINUED | OUTPATIENT
Start: 2019-02-07 | End: 2019-02-07 | Stop reason: SURG

## 2019-02-07 RX ORDER — OXYCODONE AND ACETAMINOPHEN 7.5; 325 MG/1; MG/1
1 TABLET ORAL ONCE AS NEEDED
Status: DISCONTINUED | OUTPATIENT
Start: 2019-02-07 | End: 2019-02-07

## 2019-02-07 RX ORDER — FENTANYL CITRATE 50 UG/ML
INJECTION, SOLUTION INTRAMUSCULAR; INTRAVENOUS AS NEEDED
Status: DISCONTINUED | OUTPATIENT
Start: 2019-02-07 | End: 2019-02-07 | Stop reason: SURG

## 2019-02-07 RX ORDER — KETOROLAC TROMETHAMINE 30 MG/ML
15 INJECTION, SOLUTION INTRAMUSCULAR; INTRAVENOUS EVERY 8 HOURS PRN
Status: COMPLETED | OUTPATIENT
Start: 2019-02-07 | End: 2019-02-09

## 2019-02-07 RX ORDER — SODIUM CHLORIDE 0.9 % (FLUSH) 0.9 %
1-10 SYRINGE (ML) INJECTION AS NEEDED
Status: DISCONTINUED | OUTPATIENT
Start: 2019-02-07 | End: 2019-02-09 | Stop reason: HOSPADM

## 2019-02-07 RX ORDER — DEXAMETHASONE SODIUM PHOSPHATE 10 MG/ML
INJECTION INTRAMUSCULAR; INTRAVENOUS AS NEEDED
Status: DISCONTINUED | OUTPATIENT
Start: 2019-02-07 | End: 2019-02-07 | Stop reason: SURG

## 2019-02-07 RX ORDER — ONDANSETRON 4 MG/1
4 TABLET, ORALLY DISINTEGRATING ORAL EVERY 6 HOURS PRN
Status: DISCONTINUED | OUTPATIENT
Start: 2019-02-07 | End: 2019-02-09 | Stop reason: HOSPADM

## 2019-02-07 RX ORDER — DIPHENHYDRAMINE HCL 25 MG
25 CAPSULE ORAL
Status: DISCONTINUED | OUTPATIENT
Start: 2019-02-07 | End: 2019-02-07

## 2019-02-07 RX ORDER — SODIUM CHLORIDE 450 MG/100ML
100 INJECTION, SOLUTION INTRAVENOUS CONTINUOUS
Status: DISCONTINUED | OUTPATIENT
Start: 2019-02-07 | End: 2019-02-09 | Stop reason: HOSPADM

## 2019-02-07 RX ORDER — LABETALOL HYDROCHLORIDE 5 MG/ML
5 INJECTION, SOLUTION INTRAVENOUS
Status: DISCONTINUED | OUTPATIENT
Start: 2019-02-07 | End: 2019-02-07

## 2019-02-07 RX ORDER — ASPIRIN 325 MG
325 TABLET, DELAYED RELEASE (ENTERIC COATED) ORAL 2 TIMES DAILY WITH MEALS
Status: DISCONTINUED | OUTPATIENT
Start: 2019-02-07 | End: 2019-02-09 | Stop reason: HOSPADM

## 2019-02-07 RX ORDER — PROMETHAZINE HYDROCHLORIDE 25 MG/1
25 TABLET ORAL ONCE AS NEEDED
Status: DISCONTINUED | OUTPATIENT
Start: 2019-02-07 | End: 2019-02-07

## 2019-02-07 RX ORDER — HYDRALAZINE HYDROCHLORIDE 20 MG/ML
5 INJECTION INTRAMUSCULAR; INTRAVENOUS
Status: DISCONTINUED | OUTPATIENT
Start: 2019-02-07 | End: 2019-02-07

## 2019-02-07 RX ORDER — FLUMAZENIL 0.1 MG/ML
0.2 INJECTION INTRAVENOUS AS NEEDED
Status: DISCONTINUED | OUTPATIENT
Start: 2019-02-07 | End: 2019-02-07

## 2019-02-07 RX ORDER — MORPHINE SULFATE 2 MG/ML
6 INJECTION, SOLUTION INTRAMUSCULAR; INTRAVENOUS
Status: DISCONTINUED | OUTPATIENT
Start: 2019-02-07 | End: 2019-02-09 | Stop reason: HOSPADM

## 2019-02-07 RX ORDER — ESTRADIOL 0.1 MG/D
1 FILM, EXTENDED RELEASE TRANSDERMAL 2 TIMES WEEKLY
Status: DISCONTINUED | OUTPATIENT
Start: 2019-02-11 | End: 2019-02-09 | Stop reason: HOSPADM

## 2019-02-07 RX ORDER — LIDOCAINE HYDROCHLORIDE 40 MG/ML
SOLUTION TOPICAL AS NEEDED
Status: DISCONTINUED | OUTPATIENT
Start: 2019-02-07 | End: 2019-02-07 | Stop reason: SURG

## 2019-02-07 RX ORDER — HYDROCODONE BITARTRATE AND ACETAMINOPHEN 7.5; 325 MG/1; MG/1
1 TABLET ORAL ONCE AS NEEDED
Status: DISCONTINUED | OUTPATIENT
Start: 2019-02-07 | End: 2019-02-07

## 2019-02-07 RX ORDER — ACETAMINOPHEN 325 MG/1
325 TABLET ORAL EVERY 4 HOURS PRN
Status: DISCONTINUED | OUTPATIENT
Start: 2019-02-07 | End: 2019-02-09 | Stop reason: HOSPADM

## 2019-02-07 RX ORDER — NALOXONE HCL 0.4 MG/ML
0.2 VIAL (ML) INJECTION AS NEEDED
Status: DISCONTINUED | OUTPATIENT
Start: 2019-02-07 | End: 2019-02-07

## 2019-02-07 RX ORDER — LOSARTAN POTASSIUM 50 MG/1
50 TABLET ORAL DAILY
Status: DISCONTINUED | OUTPATIENT
Start: 2019-02-08 | End: 2019-02-09 | Stop reason: HOSPADM

## 2019-02-07 RX ORDER — OXYCODONE AND ACETAMINOPHEN 7.5; 325 MG/1; MG/1
1-2 TABLET ORAL EVERY 4 HOURS PRN
Qty: 84 TABLET | Refills: 0 | Status: SHIPPED | OUTPATIENT
Start: 2019-02-07 | End: 2019-02-09 | Stop reason: HOSPADM

## 2019-02-07 RX ADMIN — HYDROMORPHONE HYDROCHLORIDE 0.5 MG: 2 INJECTION INTRAMUSCULAR; INTRAVENOUS; SUBCUTANEOUS at 16:26

## 2019-02-07 RX ADMIN — LIDOCAINE HYDROCHLORIDE 1 EACH: 40 SOLUTION TOPICAL at 13:17

## 2019-02-07 RX ADMIN — PROPOFOL 200 MG: 10 INJECTION, EMULSION INTRAVENOUS at 13:14

## 2019-02-07 RX ADMIN — FENTANYL CITRATE 100 MCG: 50 INJECTION, SOLUTION INTRAMUSCULAR; INTRAVENOUS at 15:16

## 2019-02-07 RX ADMIN — MUPIROCIN 10 APPLICATION: 20 OINTMENT TOPICAL at 20:06

## 2019-02-07 RX ADMIN — HYDROMORPHONE HYDROCHLORIDE 0.25 MG: 2 INJECTION INTRAMUSCULAR; INTRAVENOUS; SUBCUTANEOUS at 13:45

## 2019-02-07 RX ADMIN — OXYCODONE HYDROCHLORIDE AND ACETAMINOPHEN 2 TABLET: 7.5; 325 TABLET ORAL at 18:20

## 2019-02-07 RX ADMIN — HYDROMORPHONE HYDROCHLORIDE 0.5 MG: 1 INJECTION, SOLUTION INTRAMUSCULAR; INTRAVENOUS; SUBCUTANEOUS at 16:35

## 2019-02-07 RX ADMIN — HYDROMORPHONE HYDROCHLORIDE 0.25 MG: 2 INJECTION INTRAMUSCULAR; INTRAVENOUS; SUBCUTANEOUS at 14:05

## 2019-02-07 RX ADMIN — ROCURONIUM BROMIDE 40 MG: 10 INJECTION INTRAVENOUS at 13:14

## 2019-02-07 RX ADMIN — ONDANSETRON 4 MG: 2 INJECTION INTRAMUSCULAR; INTRAVENOUS at 15:51

## 2019-02-07 RX ADMIN — ACETAMINOPHEN 1000 MG: 500 TABLET, FILM COATED ORAL at 10:33

## 2019-02-07 RX ADMIN — FENTANYL CITRATE 50 MCG: 50 INJECTION, SOLUTION INTRAMUSCULAR; INTRAVENOUS at 14:55

## 2019-02-07 RX ADMIN — ROPIVACAINE HYDROCHLORIDE 30 ML: 5 INJECTION, SOLUTION EPIDURAL; INFILTRATION; PERINEURAL at 12:40

## 2019-02-07 RX ADMIN — FENTANYL CITRATE 50 MCG: 50 INJECTION, SOLUTION INTRAMUSCULAR; INTRAVENOUS at 16:40

## 2019-02-07 RX ADMIN — KETOROLAC TROMETHAMINE 15 MG: 30 INJECTION, SOLUTION INTRAMUSCULAR at 20:20

## 2019-02-07 RX ADMIN — LIDOCAINE HYDROCHLORIDE 60 MG: 20 INJECTION, SOLUTION INFILTRATION; PERINEURAL at 13:14

## 2019-02-07 RX ADMIN — HYDROMORPHONE HYDROCHLORIDE 0.5 MG: 1 INJECTION, SOLUTION INTRAMUSCULAR; INTRAVENOUS; SUBCUTANEOUS at 18:00

## 2019-02-07 RX ADMIN — FENTANYL CITRATE 50 MCG: 50 INJECTION, SOLUTION INTRAMUSCULAR; INTRAVENOUS at 16:30

## 2019-02-07 RX ADMIN — CLINDAMYCIN PHOSPHATE 900 MG: 900 INJECTION, SOLUTION INTRAVENOUS at 20:06

## 2019-02-07 RX ADMIN — MIDAZOLAM 2 MG: 1 INJECTION INTRAMUSCULAR; INTRAVENOUS at 11:00

## 2019-02-07 RX ADMIN — HYDROMORPHONE HYDROCHLORIDE 0.5 MG: 1 INJECTION, SOLUTION INTRAMUSCULAR; INTRAVENOUS; SUBCUTANEOUS at 16:52

## 2019-02-07 RX ADMIN — DEXAMETHASONE SODIUM PHOSPHATE 8 MG: 10 INJECTION INTRAMUSCULAR; INTRAVENOUS at 13:20

## 2019-02-07 RX ADMIN — FENTANYL CITRATE 100 MCG: 50 INJECTION, SOLUTION INTRAMUSCULAR; INTRAVENOUS at 11:50

## 2019-02-07 RX ADMIN — CEFAZOLIN SODIUM 2 G: 2 INJECTION, SOLUTION INTRAVENOUS at 13:24

## 2019-02-07 RX ADMIN — SODIUM CHLORIDE, POTASSIUM CHLORIDE, SODIUM LACTATE AND CALCIUM CHLORIDE: 600; 310; 30; 20 INJECTION, SOLUTION INTRAVENOUS at 14:10

## 2019-02-07 RX ADMIN — TRANEXAMIC ACID 1000 MG: 100 INJECTION, SOLUTION INTRAVENOUS at 13:22

## 2019-02-07 RX ADMIN — SODIUM CHLORIDE, POTASSIUM CHLORIDE, SODIUM LACTATE AND CALCIUM CHLORIDE 9 ML/HR: 600; 310; 30; 20 INJECTION, SOLUTION INTRAVENOUS at 10:29

## 2019-02-07 RX ADMIN — CELECOXIB 200 MG: 200 CAPSULE ORAL at 10:33

## 2019-02-07 RX ADMIN — HYDROMORPHONE HYDROCHLORIDE 0.5 MG: 1 INJECTION, SOLUTION INTRAMUSCULAR; INTRAVENOUS; SUBCUTANEOUS at 18:05

## 2019-02-07 RX ADMIN — MIDAZOLAM 2 MG: 1 INJECTION INTRAMUSCULAR; INTRAVENOUS at 11:50

## 2019-02-07 RX ADMIN — ASPIRIN 325 MG: 325 TABLET, DELAYED RELEASE ORAL at 20:06

## 2019-02-07 RX ADMIN — SODIUM CHLORIDE 100 ML/HR: 4.5 INJECTION, SOLUTION INTRAVENOUS at 20:07

## 2019-02-07 RX ADMIN — OXYCODONE HYDROCHLORIDE AND ACETAMINOPHEN 2 TABLET: 7.5; 325 TABLET ORAL at 22:23

## 2019-02-07 RX ADMIN — FAMOTIDINE 20 MG: 10 INJECTION, SOLUTION INTRAVENOUS at 11:00

## 2019-02-07 RX ADMIN — FENTANYL CITRATE 100 MCG: 50 INJECTION, SOLUTION INTRAMUSCULAR; INTRAVENOUS at 13:14

## 2019-02-07 RX ADMIN — CEFAZOLIN SODIUM 2 G: 2 INJECTION, SOLUTION INTRAVENOUS at 15:57

## 2019-02-07 NOTE — ANESTHESIA PROCEDURE NOTES
Airway  Urgency: elective    Date/Time: 2/7/2019 1:34 PM  End Time:2/7/2019 1:34 PM  Airway not difficult    General Information and Staff    Patient location during procedure: OR  Anesthesiologist: Fabiano Beltre MD  CRNA: Lynn Cid CRNA    Indications and Patient Condition  Indications for airway management: airway protection    Preoxygenated: yes  MILS maintained throughout  Mask difficulty assessment: 1 - vent by mask    Final Airway Details  Final airway type: endotracheal airway      Successful airway: ETT  Cuffed: yes   Successful intubation technique: direct laryngoscopy  Facilitating devices/methods: intubating stylet  Endotracheal tube insertion site: oral  Blade: Coyle  Blade size: 2  ETT size (mm): 7.0  Cormack-Lehane Classification: grade I - full view of glottis  Placement verified by: chest auscultation and capnometry   Measured from: lips  Number of attempts at approach: 1    Additional Comments  Atraumatic, Secured after verification of placement

## 2019-02-07 NOTE — H&P
"History and Physical    Patient Name:  Holly Bateman  YOB: 1960    Medical Records Number:  [id number]    Date of Admission:  2/7/2019  9:39 AM    Chief Complaint:  OA (osteoarthritis) of knee [M17.10]    Holly Bateman is a 58 y.o. female who presents c/o severe left knee pain.  The pain has been on and off for many years, worsening to the point where the pain is becoming disabling.  She had a tka 2 years ago and has had pain ever since.  The pain is a constant dull ache with occasional sharp, stabbing pain.  The patient has failed conservative treatment and would like to proceed with left total knee arthroplasty revision.    /79 (BP Location: Right arm, Patient Position: Lying)   Pulse 75   Temp 98.1 °F (36.7 °C) (Oral)   Resp 20   Ht 162.6 cm (64\")   Wt 77.2 kg (170 lb 3.1 oz)   SpO2 95%   BMI 29.21 kg/m²     Past Medical History:    Past Medical History:   Diagnosis Date   • Arthritis    • Hypertension    • Knee pain, bilateral    • Meniere disease        Social History:    Social History     Socioeconomic History   • Marital status: Single     Spouse name: Not on file   • Number of children: Not on file   • Years of education: Not on file   • Highest education level: Not on file   Social Needs   • Financial resource strain: Not on file   • Food insecurity - worry: Not on file   • Food insecurity - inability: Not on file   • Transportation needs - medical: Not on file   • Transportation needs - non-medical: Not on file   Occupational History   • Not on file   Tobacco Use   • Smoking status: Never Smoker   • Smokeless tobacco: Never Used   Substance and Sexual Activity   • Alcohol use: No   • Drug use: No   • Sexual activity: Defer   Other Topics Concern   • Not on file   Social History Narrative   • Not on file       Family History:    Family History   Problem Relation Age of Onset   • Colon cancer Mother    • Lung cancer Father        Current Medications:    Current " Facility-Administered Medications:   •  ceFAZolin in dextrose (ANCEF) IVPB solution 2 g, 2 g, Intravenous, Once, Myron Easley MD  •  fentaNYL citrate (PF) (SUBLIMAZE) injection 50 mcg, 50 mcg, Intravenous, Q10 Min PRN, Franco Sofia MD  •  lactated ringers infusion, 9 mL/hr, Intravenous, Continuous, Franco Sofia MD, Last Rate: 9 mL/hr at 02/07/19 1029, 9 mL/hr at 02/07/19 1029  •  lidocaine PF 1% (XYLOCAINE) injection 0.5 mL, 0.5 mL, Injection, Once PRN, Franco Sofia MD  •  midazolam (VERSED) injection 1 mg, 1 mg, Intravenous, Q5 Min PRN **OR** midazolam (VERSED) injection 2 mg, 2 mg, Intravenous, Q5 Min PRN, Franco Sofia MD, 2 mg at 02/07/19 1100  •  ropivacaine (NAROPIN) 0.5 % 50 mL, ketorolac (TORADOL) 30 mg, EPINEPHrine PF (ADRENALIN) 0.3 mg in sodium chloride 0.9 % 101.3 mL, , Injection, Once, Myron Easley MD  •  sodium chloride 0.9 % flush 1-10 mL, 1-10 mL, Intravenous, PRN, Franco Sofia MD    Allergies:  No Known Allergies    Review of Systems:   HEENT: Patient denies any headaches, vision changes, change in hearing, or tinnitus, Patient denies any rhinorrhea,epistaxis, sinus pain, mouth or dental problems, sore throat or hoarseness, or dysphagia  Pulmonary: Patient denies any cough, congestion, SOA, or wheezing  Cardiovascular: Patient denies any chest pain, dyspnea, palpitations, weakness, intolerance of exercise, varicosities, swelling of extremities, known murmur  Gastrointestinal:  Patient denies nausea, vomiting, diarrhea, constipation, loss  of appetite, change in appetite, dysphagia, gas, heartburn, melena, change in bowel habits, use of laxatives or other drugs to alter the function of the gastrointestinal tract.  Genital/Urinary: Patient denies dysuria, change in color of urine, change in frequency of urination, pain with urgency, incontinence, retention, or nocturia.  Musculoskeletal: Patient denies increased warmth; redness; or swelling of  joints; limitation of function; deformity; crepitation: pain in a joint or an extremity, the neck, or the back, especially with movement.  Neurological: Patient denies dizziness, tremor, ataxia, difficulty in speaking, change in speech, paresthesia, loss of sensation, seizures, syncope, changes in memory.  Endocrine system: Patient denies tremors, palpitations, intolerance of heat or cold, polyuria, polydipsia, polyphagia, diaphoresis, exophthalmos, or goiter.  Psychological: Patient denies thoughts/plans or harming self or other; depression,  insomnia, night terrors, long, memory loss, disorientation.  Skin: Patient denies any bruising, rashes, discoloration, pruritus, wounds, ulcers, decubiti, changes in the hair or nails  Hematopoietic: Patient denies history of spontaneous or excessive bleeding, epistaxis, hematuria, melena, fatigue, enlarged or tender lymph nodes, pallor, history of anemia.        Physical Exam:   Awake, A&O x3, affect normal, no acute distress  Ambulating with a limp due to knee pain  Knee ROM is limited due to pain (5-115)  Strength is 4/5 in the quad, hamstring and calf  Cap refill is normal, Sensation intact    Card:  RR, HD Stable  Pulm:  Regular breathing, no S.O.A  Abd:  Soft, NT, ND    Lab Results (last 24 hours)     ** No results found for the last 24 hours. **          Xr Knee 1 Or 2 View Left    Result Date: 1/28/2019  Narrative: XR KNEE 1 OR 2 VW LEFT-  INDICATIONS:    Preoperative evaluation  TECHNIQUE: FRONTAL AND LATERAL VIEWS OF THE LEFT KNEE  COMPARISON: 3/14/2017  FINDINGS:   No acute fracture is identified. Left knee arthroplasty hardware is noted. Small lucency adjacent to the medial aspect of the tibial component, potentially evidence of loosening or infection, correlate clinically. No knee effusion.      Impression:  Knee arthroplasty hardware with small nonspecific lucency adjacent to the medial aspect of the tibial component. No acute fracture is identified.  This  report was finalized on 1/28/2019 3:37 PM by Dr. Jerome Leong M.D.      Xr Chest Pa & Lateral    Result Date: 1/28/2019  Narrative: XR CHEST PA AND LATERAL-  HISTORY: Female who is 58 years-old,  preoperative evaluation  TECHNIQUE: Frontal and lateral views of the chest  COMPARISON: 3/8/2017  FINDINGS: Heart size is normal. Aorta is tortuous. Pulmonary vasculature is unremarkable. No focal pulmonary consolidation, pleural effusion, or pneumothorax. No acute osseous process.      Impression: No evidence for acute pulmonary process. Tortuous aorta. Follow-up as clinically indicated.  This report was finalized on 1/28/2019 1:27 PM by Dr. Jerome Leong M.D.          Assessment:  Aseptic Loosening Left TKA    Plan:  Patient's pain is becoming disabling, despite extensive conservative treatment.  Radiographs reveal signs of implant loosening.  The crp and sed rate are normal, so no signs of infection.  The risks of surgery, including, but not limited to, heart attack, stroke, dying, DVT, nerve injury, vascular injury, arthrofibrosis and infection were discussed.  The alternatives and benefits were also discussed.  All questions answered and the patient wishes to proceed with left total knee arthroplasty revision.

## 2019-02-07 NOTE — ANESTHESIA PROCEDURE NOTES
Peripheral Block      Patient reassessed immediately prior to procedure    Patient location during procedure: holding area  Start time: 2/7/2019 12:20 PM  Stop time: 2/7/2019 12:30 PM  Reason for block: at surgeon's request and post-op pain management  Performed by  Anesthesiologist: Fabiano Beltre MD  Preanesthetic Checklist  Completed: patient identified, site marked, surgical consent, pre-op evaluation, timeout performed, IV checked, risks and benefits discussed and monitors and equipment checked  Prep:  Pt Position: supine  Sterile barriers:gloves and cap  Prep: ChloraPrep  Patient monitoring: blood pressure monitoring, continuous pulse oximetry and EKG  Procedure  Sedation:yes    Guidance:ultrasound guided  ULTRASOUND INTERPRETATION.  Using ultrasound guidance a 22 G gauge needle was placed in close proximity to the femoral nerve, at which point, under ultrasound guidance anesthetic was injected in the area of the nerve and spread of the anesthesia was seen on ultrasound in close proximity thereto.  There were no abnormalities seen on ultrasound; a digital image was taken; and the patient tolerated the procedure with no complications. Images:still images obtained    Laterality:left  Block Type:adductor canal block  Injection Technique:single-shot  Needle Type:echogenic  Needle Gauge:21 G    Medications Used: ropivacaine (NAROPIN) 0.5 % injection, 30 mL  Med admintered at 2/7/2019 12:40 PM  Post Assessment  Injection Assessment: incremental injection, no paresthesia on injection and negative aspiration for heme  Patient Tolerance:comfortable throughout block  Complications:no

## 2019-02-07 NOTE — ANESTHESIA POSTPROCEDURE EVALUATION
"Patient: Holly Bateman    Procedure Summary     Date:  02/07/19 Room / Location:  [facility] OR [address] [place] [address] MAIN OR    Anesthesia Start:  1311 Anesthesia Stop:  1628    Procedure:  LT TOTAL KNEE ARTHROPLASTY REVISION ALL COMPONENTS (Left Knee) Diagnosis:      Surgeon:  Myron Easley MD Provider:  Fabiano Beltre MD    Anesthesia Type:  general ASA Status:  2          Anesthesia Type: general  Last vitals  BP   131/84 (02/07/19 1745)   Temp   37.2 °C (99 °F) (02/07/19 1630)   Pulse   93 (02/07/19 1745)   Resp   16 (02/07/19 1745)     SpO2   97 % (02/07/19 1745)     Post Anesthesia Care and Evaluation    Patient location during evaluation: bedside  Patient participation: complete - patient participated  Level of consciousness: sleepy but conscious  Pain score: 0  Pain management: adequate  Airway patency: patent  Anesthetic complications: No anesthetic complications    Cardiovascular status: acceptable  Respiratory status: acceptable  Hydration status: acceptable    Comments: /84   Pulse 93   Temp 37.2 °C (99 °F) (Oral)   Resp 16   Ht 162.6 cm (64\")   Wt 77.2 kg (170 lb 3.1 oz)   SpO2 97%   BMI 29.21 kg/m²         "

## 2019-02-07 NOTE — ANESTHESIA PREPROCEDURE EVALUATION
Anesthesia Evaluation     Patient summary reviewed and Nursing notes reviewed   no history of anesthetic complications:  NPO Solid Status: > 6 hours  NPO Liquid Status: > 2 hours           Airway   Mallampati: II  TM distance: >3 FB  Neck ROM: full  No difficulty expected  Dental - normal exam     Pulmonary - normal exam   (-) not a smoker  Cardiovascular - normal exam    ECG reviewed    (+) hypertension,   (-) angina      Neuro/Psych  GI/Hepatic/Renal/Endo      Musculoskeletal     Abdominal    Substance History      OB/GYN          Other                        Anesthesia Plan    ASA 2     general   (LEFT AC Reji)  Anesthetic plan, all risks, benefits, and alternatives have been provided, discussed and informed consent has been obtained with: patient.

## 2019-02-07 NOTE — OP NOTE
Operative Note    Patient Name:  Holly Bateman  YOB: 1960  Medical Records Number:  [id number]    Date of Procedure:  2/7/2019    Pre-operative Diagnosis:  Aseptic Loosening Left TKA    Post-operative Diagnosis:  Aseptic Loosening Left TKA    Procedure Performed:  Left Total Knee Arthroplasty Revision    Implants:  Biomet Vanguard 360 Revision TKA, 62.5 Femoral Component with 5 mm distal femoral augments and a 17x80 mm Splined Stem, 67 Tibial Tray with 10 mm Augments and a 14x80 Splined Stem, 14 PS Std.  Polyethylene Insert    Surgeon:  Myron Easley M.D.    Assistant: Di Gaspar (who was present during the critical portions of the case, thereby decreasing operative time and patient morbidity)    Anesthetic Type:  General    Estimated Blood Loss:  250cc's    Specimens:   Order Name Source Comment Collection Info Order Time   ANAEROBIC CULTURE Knee, Left  Collected By: Myron Easley MD 2/7/2019  1:41 PM   WOUND CULTURE Knee, Left  Collected By: Myron Easley MD 2/7/2019  1:41 PM       No Complications      Indications for Procedure:  Holly Bateman is a 58 y.o. female suffering from persistent pain in her left tka which she underwent 2 years ago.  Bone scan consistent with aseptic loosening.  CRP and sed rate normal.  The patients pain is becoming disabling, despite extensive conservative care, including NSAIDS, therapy and injections. The risks, benefits and alternatives were discussed and the patient wishes to proceed with left total knee arthroplasty revision.      Procedure Performed:    After informed consent was obtained and pre-operative IV Kefzol  given, prior to tourniquet inflation, the patient was taken to the operating room and placed supine on the operating table.  After general anesthesia induced and 1 gm of Tranxemic Acid given,  the patient's left lower extremity was prepped with chloraprep and draped in a sterile fashion.    A midline incision was made overlying the left  knee and we sharply dissected down to expose the parapatellar retinaculum.  A mid-vastus, muscle sparing, parapatellar arthrotomy was performed and we elevated the soft tissue both medially and laterally.  We everted the patella and cleared all soft tissue surrounding the patella button and inspected the implant/cement and the cement/bone interfaces and the patella was noted to be stable with no signs of loosening.  We protected the patella with the patella protector and turned our attention to the femur and the tibia.    We gained exposure of the femur and tibia using Porsha osteotomes and we carefully removed the femoral and tibial components with very little bone loss.  Both the tibia and the femur were removed easily consistent with aseptic loosening.  Once the implants and all excess cement were removed, we drilled drill holes into the femoral and the tibial intramedullary canals and proceeded to irrigate the canals to remove the fatty marrow.  We sequentially reamed the tibia to 14 mm and the femur to 17 mm.   We used the 17 mm reamer and the 5 degree valgus cut block to make our distal femoral cut.  Once we had a smooth surface, we measured the femur to be 62.5.  We assured we had rotational alignment using the epicondylar axis, then we used the four-in-one cut block to make our anterior, posterior, anterior and posterior chamfer cuts.  We placed our femoral trial, had excellent fit, and extended the knee and marked [place]'s line on the tibia.      We then turned our attention to the tibia, where we irrigated the canal again.  We used the intramedullary priti and the 0 degree posterior sloped cut block to remove 1 mm of bone from the effected side of the tibia.  Prior to making our cut, we assured we had rotational alignment using the external guide and [place]'s line.  Once we had a smooth surface, we measured the tibia to be 67.  We then removed soft tissue and bony debris from the posterior aspect of the  "knee.    We placed our trial implants and had excellent fit, range of motion, stability and ligament balance, throughout a complete arc of motion with  5 mm distal femoral auments and 10 mm proximal tibial augments and a 14 PS standard polyethylene liner.  We removed our trial implants, punched the tibial keel, copiously irrigated the knee, then cemented our implants in place using palacos cement.  Once the cement cured, we trialed again with the 12, 14 and 16 PS standard and PS plus polyethylene liners.  The 14 PS std gave us the best range of motion, stability and ligament balance, throughout a complete arc of motion.  We removed the trials, copiously irrigated the knee, gave IV antibiotics and local anesthetic, then placed our permanent polyethylene liner.  We placed a 1/8\" hemovac drain, then closed the arthrotomy with #1 Vicryl pop-off sutures.  The subcutaneous tissue was closed with 2-0 vicryl pop-off sutures.  The skin was closed with staples.  We placed a sterile dressing of Xeroform, 4x4's, abdominal pads, cast padding and an Ace Wrap.  The patient was then awakened from general anesthesia and taken to the recovery room in stable condition.    The patient will be started on Aspirin 325mg twice daily for DVT prophylaxis.  IV antibiotics will be discontinued within 24 hours of surgery.  Immediately prior to surgery, there were no acute Thromboembolic nor Cardiovascular risk factors.  An updated Medical Reconciliation form is on the chart.        "

## 2019-02-08 LAB
ANION GAP SERPL CALCULATED.3IONS-SCNC: 13.2 MMOL/L
BUN BLD-MCNC: 12 MG/DL (ref 6–20)
BUN/CREAT SERPL: 24 (ref 7–25)
CALCIUM SPEC-SCNC: 9.2 MG/DL (ref 8.6–10.5)
CHLORIDE SERPL-SCNC: 99 MMOL/L (ref 98–107)
CO2 SERPL-SCNC: 25.8 MMOL/L (ref 22–29)
CREAT BLD-MCNC: 0.5 MG/DL (ref 0.57–1)
DEPRECATED RDW RBC AUTO: 43.2 FL (ref 37–54)
ERYTHROCYTE [DISTWIDTH] IN BLOOD BY AUTOMATED COUNT: 12.9 % (ref 11.7–13)
GFR SERPL CREATININE-BSD FRML MDRD: 128 ML/MIN/1.73
GLUCOSE BLD-MCNC: 133 MG/DL (ref 65–99)
HCT VFR BLD AUTO: 38.7 % (ref 35.6–45.5)
HGB BLD-MCNC: 12.4 G/DL (ref 11.9–15.5)
MCH RBC QN AUTO: 29.4 PG (ref 26.9–32)
MCHC RBC AUTO-ENTMCNC: 32 G/DL (ref 32.4–36.3)
MCV RBC AUTO: 91.7 FL (ref 80.5–98.2)
PLATELET # BLD AUTO: 195 10*3/MM3 (ref 140–500)
PMV BLD AUTO: 9.9 FL (ref 6–12)
POTASSIUM BLD-SCNC: 4.2 MMOL/L (ref 3.5–5.2)
RBC # BLD AUTO: 4.22 10*6/MM3 (ref 3.9–5.2)
SODIUM BLD-SCNC: 138 MMOL/L (ref 136–145)
WBC NRBC COR # BLD: 15.07 10*3/MM3 (ref 4.5–10.7)

## 2019-02-08 PROCEDURE — 80048 BASIC METABOLIC PNL TOTAL CA: CPT | Performed by: ORTHOPAEDIC SURGERY

## 2019-02-08 PROCEDURE — 97150 GROUP THERAPEUTIC PROCEDURES: CPT

## 2019-02-08 PROCEDURE — 97110 THERAPEUTIC EXERCISES: CPT

## 2019-02-08 PROCEDURE — 97161 PT EVAL LOW COMPLEX 20 MIN: CPT

## 2019-02-08 PROCEDURE — 85027 COMPLETE CBC AUTOMATED: CPT | Performed by: ORTHOPAEDIC SURGERY

## 2019-02-08 PROCEDURE — 25010000002 KETOROLAC TROMETHAMINE PER 15 MG: Performed by: ORTHOPAEDIC SURGERY

## 2019-02-08 RX ORDER — OXYCODONE AND ACETAMINOPHEN 10; 325 MG/1; MG/1
2 TABLET ORAL EVERY 4 HOURS PRN
Status: DISCONTINUED | OUTPATIENT
Start: 2019-02-08 | End: 2019-02-08

## 2019-02-08 RX ORDER — OXYCODONE AND ACETAMINOPHEN 10; 325 MG/1; MG/1
1 TABLET ORAL EVERY 4 HOURS PRN
Status: DISCONTINUED | OUTPATIENT
Start: 2019-02-08 | End: 2019-02-09 | Stop reason: HOSPADM

## 2019-02-08 RX ORDER — OXYCODONE AND ACETAMINOPHEN 10; 325 MG/1; MG/1
2 TABLET ORAL EVERY 4 HOURS PRN
Status: DISCONTINUED | OUTPATIENT
Start: 2019-02-08 | End: 2019-02-09 | Stop reason: HOSPADM

## 2019-02-08 RX ADMIN — OXYCODONE HYDROCHLORIDE AND ACETAMINOPHEN 2 TABLET: 10; 325 TABLET ORAL at 15:16

## 2019-02-08 RX ADMIN — OXYCODONE HYDROCHLORIDE AND ACETAMINOPHEN 2 TABLET: 7.5; 325 TABLET ORAL at 06:18

## 2019-02-08 RX ADMIN — MUPIROCIN 10 APPLICATION: 20 OINTMENT TOPICAL at 20:26

## 2019-02-08 RX ADMIN — OXYCODONE HYDROCHLORIDE AND ACETAMINOPHEN 2 TABLET: 10; 325 TABLET ORAL at 23:20

## 2019-02-08 RX ADMIN — DOCUSATE SODIUM 100 MG: 100 CAPSULE, LIQUID FILLED ORAL at 06:21

## 2019-02-08 RX ADMIN — SODIUM CHLORIDE, PRESERVATIVE FREE 3 ML: 5 INJECTION INTRAVENOUS at 12:45

## 2019-02-08 RX ADMIN — HYDROCHLOROTHIAZIDE 12.5 MG: 12.5 CAPSULE ORAL at 08:12

## 2019-02-08 RX ADMIN — SODIUM CHLORIDE, PRESERVATIVE FREE 3 ML: 5 INJECTION INTRAVENOUS at 20:26

## 2019-02-08 RX ADMIN — KETOROLAC TROMETHAMINE 15 MG: 30 INJECTION, SOLUTION INTRAMUSCULAR at 12:45

## 2019-02-08 RX ADMIN — KETOTIFEN FUMARATE 1 DROP: 0.35 SOLUTION/ DROPS OPHTHALMIC at 15:19

## 2019-02-08 RX ADMIN — OXYCODONE HYDROCHLORIDE AND ACETAMINOPHEN 2 TABLET: 10; 325 TABLET ORAL at 19:21

## 2019-02-08 RX ADMIN — ASPIRIN 325 MG: 325 TABLET, DELAYED RELEASE ORAL at 08:11

## 2019-02-08 RX ADMIN — LOSARTAN POTASSIUM 50 MG: 50 TABLET, FILM COATED ORAL at 08:12

## 2019-02-08 RX ADMIN — FERROUS SULFATE TAB 325 MG (65 MG ELEMENTAL FE) 325 MG: 325 (65 FE) TAB at 08:11

## 2019-02-08 RX ADMIN — CLINDAMYCIN PHOSPHATE 900 MG: 900 INJECTION, SOLUTION INTRAVENOUS at 04:07

## 2019-02-08 RX ADMIN — DOCUSATE SODIUM 100 MG: 100 CAPSULE, LIQUID FILLED ORAL at 19:22

## 2019-02-08 RX ADMIN — MUPIROCIN 1 APPLICATION: 20 OINTMENT TOPICAL at 08:36

## 2019-02-08 RX ADMIN — ASPIRIN 325 MG: 325 TABLET, DELAYED RELEASE ORAL at 18:26

## 2019-02-08 RX ADMIN — OXYCODONE HYDROCHLORIDE AND ACETAMINOPHEN 2 TABLET: 7.5; 325 TABLET ORAL at 02:21

## 2019-02-08 RX ADMIN — OXYCODONE HYDROCHLORIDE AND ACETAMINOPHEN 2 TABLET: 10; 325 TABLET ORAL at 10:37

## 2019-02-08 NOTE — DISCHARGE PLACEMENT REQUEST
"Dexter Baxter (58 y.o. Female)     Date of Birth Social Security Number Address Home Phone MRN    1960  [address]  [facility] [address] [phone] [id number]    Jewish Marital Status          [facility] Single       Admission Date Admission Type Admitting Provider Attending Provider Department, Room/Bed    2/7/19 Elective Myron Easley MD Goodin, Robert A, MD [address], P895/1    Discharge Date Discharge Disposition Discharge Destination         Home or Self Care              Attending Provider:  Myron Easley MD    Allergies:  No Known Allergies    Isolation:  None   Infection:  None   Code Status:  CPR    Ht:  162.6 cm (64\")   Wt:  77.2 kg (170 lb 3.1 oz)    Admission Cmt:  None   Principal Problem:  None                Active Insurance as of 2/7/2019     Primary Coverage     Payor Plan Insurance Group Employer/Plan Group    HU[employer] HU[employer]NA [id number]     Payor Plan Address Payor Plan Phone Number Payor Plan Fax Number Effective Dates    [address] [address] [phone]  1/1/2016 - None Entered    [facility] [address]       Subscriber Name Subscriber Birth Date Member ID       DEXTER BAXTER 1960 [id number]                 Emergency Contacts      (Rel.) Home Phone Work Phone Mobile Phone    JordiSandee (Mother) [phone] -- --    Stevan Wiley (Friend) -- -- [phone]        "

## 2019-02-08 NOTE — DISCHARGE SUMMARY
Discharge Summary    Patient Name:  Holly Bateman  YOB: 1960  Medical Records Number:  [id number]    Date of Admission:  2/7/2019  Date of Discharge:  2/8/2019    Primary Discharge Diagnosis:  OA (osteoarthritis) of knee [M17.10]    Secondary Discharge Diagnosis:    Problem List Items Addressed This Visit     None      Visit Diagnoses     Failed total knee arthroplasty (CMS/[facility])        Relevant Orders    Anaerobic Culture - Wound, Knee, Left    Wound Culture - Wound, Knee, Left (Completed)          Procedures Performed:  Left Total Knee Arthroplasty      Hospital Course:    Holly Bateman is a 58 y.o.  female who underwent successful left tka on 2/7/2019.  Holly Bateman was started on Aspirin 325mg po twice daily immediately post-operatively for DVT prophylaxis.  On post-op day 1 the patients dressing was changed and their incision was clean, with no signs of infection and their calf was soft, with no signs of DVT.  The patient progressed well with physical therapy and the patients hemoglobin remained stable. On post-operative day 2 the patient was felt ready for discharge.     Total Knee Joint Replacement Discharge Instructions:    I. ACTIVITIES:  1. Exercises:  ? Complete exercise program as taught by the hospital physical therapist 2 times per day  ? Exercise program will be advanced by the physical therapist  ? During the day be up ambulating every 2 hours (while awake) for short distances  ? Complete the ankle pump exercises at least 10 times per hour (while awake)  ? Elevate legs most of the day the first week post operatively and thereafter elevate legs when in bed and for at least 30 minutes during the day. Caution must be taken to avoid pillow placement under the bend of the knee as this can led to flexion contractures of the knee.  ? Use cold packs 20-30 minutes approximately 5 times per day. This should be done before and after completing your exercises and at any time you are  experiencing pain/ stiffness in your operative extremity.      2. Activities of Daily Living:  ? No tub baths, hot tubs, or swimming pools for 4 weeks  ? May shower and let water run over the incision on post-operative day #5 if no drainage. Do not scrub or rub the incision. Simply let the water run over the incision and pat dry.    II. Restrictions  ? Do not cross legs or kneel  ? Your surgeon will discuss with you when you will be able to drive again.  ? Weight bearing is as tolerated  ? First week stay inside on even terrain. May go up and down stairs one stair at a time utilizing the hand rail  ? After one week, you may venture outside.    III. Precautions:  ? Everyone that comes near you should wash their hands  ? No elective dental, genital-urinary, or colon procedures or surgical procedures for 12 weeks after surgery unless absolutely necessary.  ?  If dental work or surgical procedure is deemed absolutely necessary, you will need to contact your surgeon as you will need to take antibiotics 1 hour prior to any dental work (including teeth cleanings).  ? Please discuss with your surgeon prophylactic antibiotics as the length of time this intervention will be necessary for you varies with each patient’s health history and situation.  ? Avoid sick people. If you must be around someone who is ill, they should wear a mask.  ? Avoid visits to the Emergency Room or Urgent Care unless you are having a life threatening event.   ? If ordered stockings are to be placed on in the morning and removed at night. Monitor the stockings to ensure that any swelling is not causing the stockings to become too tight. In this case, remove stockings immediately.    IV. INCISION CARE:  ? Wash your hands prior to dressing changes  ? Change the dressing as needed to keep incision clean and dry. Utilize dry gauze and paper tape. Avoid touching the side of the gauze that goes against the incision with your hands.  ? No creams or  ointments to the incision  ? May remove dressing once the incision is free of drainage  ? Do not touch or pick at the incision  ? Check incision every day and notify surgeon immediately if any of the following signs or symptoms are noted:  o Increase in redness  o Increase in swelling around the incision and of the entire extremity  o Increase in pain  o Drainage oozing from the incision  o Pulling apart of the edges of the incision  o Increase in overall body temperature (greater than 100.5 degrees)  ? Your surgeon will instruct you regarding suture or staple removal    V. Medications:   1. Anticoagulants: You will be discharged on an anticoagulant. This is a prophylactic medication that helps prevent blood clots during your post-operative period. The type and length of dosage varies based on your individual needs, procedure performed, and surgeon’s preference.  ? While taking the anticoagulant, you should avoid taking any additional aspirin, ibuprofen (Advil or Motrin), Aleve (Naprosyn) or other non-steroidal anti-inflammatory medications.   ? Notify surgeon immediately if any pia bleeding is noted in the urine, stool, emesis, or from the nose or the incision. Blood in the stool will often appear as black rather than red. Blood in urine may appear as pink. Blood in emesis may appear as brown/black like coffee grounds.  ? You will need to apply pressure for longer periods of time to any cuts or abrasions to stop bleeding  ? Avoid alcohol while taking anticoagulants    2. Stool Softeners: You will be at greater risk of constipation after surgery due to being less mobile and the pain medications.   ? Take stool softeners as instructed by your surgeon while on pain medications. Over the counter Colace 100 mg 1-2 capsules twice daily.   ? If stools become too loose or too frequent, please decreases the dosage or stop the stool softener.  ? If constipation occurs despite use of stool softeners, you are to continue the  stool softeners and add a laxative (Milk of Magnesia 1 ounce daily as needed)  ? Drink plenty of fluids, and eat fruits and vegetables during your recovery time    3. Pain Medications utilized after surgery are narcotics and the law requires that the following information be given to all patients that are prescribed narcotics:  ? CLASSIFICATION: Pain medications are called Opioids and are narcotics  ? LEGALITIES: It is illegal to share narcotics with others and to drive within 24 hours of taking narcotics  ? POTENTIAL SIDE EFFECTS: Potential side effects of opioids include: nausea, vomiting, itching, dizziness, drowsiness, dry mouth, constipation, and difficulty urinating.  ? POTENTIAL ADVERSE EFFECTS:   o Opioid tolerance can develop with use of pain medications and this simply means that it requires more and more of the medication to control pain; however, this is seen more in patients that use opioids for longer periods of time.  o Opioid dependence can develop with use of Opioids and this simply means that to stop the medication can cause withdrawal symptoms; however, this is seen with patients that use Opioids for longer periods of time.  o Opioid addiction can develop with use of Opioids and the incidence of this is very unlikely in patients who take the medications as ordered and stop the medications as instructed.  o Opioid overdose can be dangerous, but is unlikely when the medication is taken as ordered and stopped when ordered. It is important not to mix opioids with alcohol or with and type of sedative such as Benadryl as this can lead to over sedation and respiratory difficulty.  ? DOSAGE:   o Pain medications will need to be taken consistently for the first week to decrease pain and promote adequate pain relief and participation in physical therapy.  o After the initial surgical pain begins to resolve, you may begin to decrease the pain medication. By the end of 6-8 weeks, you should be off of pain  medications.  o Refills will not be given by the office during evening hours, on weekends, or after 6-8 weeks post-op.  o To seek refills on pain medications during the initial 6 week post-operative period, you must call the office 48 hours in advance to request the refill. The office will then notify you when to  the prescription. DO NOT wait until you are out of the medication to request a refill.    V. FOLLOW-UP VISITS:  ? You will need to follow up in the office with your surgeon in 3 weeks. Please call this number [phone] to schedule this appointment.  If you have any concerns or suspected complications prior to your follow up visit, please call your surgeons office. Do not wait until your appointment time if you suspect complications. These will need to be addressed in the office promptly.      Discharge Medications:     1) Percocet 10/325mg  1-2 po q 4-6 hours for pain control  2)  Aspirin 325 mg po twice daily for 2 weeks, then once daily for 4 weeks.    CC:Ryle, Larry [place], MD:MIKAELA Gibson

## 2019-02-08 NOTE — PLAN OF CARE
Problem: Patient Care Overview  Goal: Plan of Care Review   02/08/19 1017   Coping/Psychosocial   Plan of Care Reviewed With patient   OTHER   Outcome Summary Pt is a 58 y.o. female s/p L TKA on 2/7/2019 and is WBAT. Pt presents today with weakness, decreased ROM, and decreased functional mobility. Pt requires CGA for STS transfers, and CGA for amb with FWW for 30 ft. Pt will benefit from skilled PT to address the previous impairments and improve safety with functional mobility. Pt has FWW for home. Anticipate pt will D/C to home with assist and HH PT in 1-2 days.

## 2019-02-08 NOTE — THERAPY TREATMENT NOTE
Acute Care - Physical Therapy Treatment Note  [facility]     Patient Name: Holly Bateman  : 1960  MRN: [id number]  Today's Date: 2019  Onset of Illness/Injury or Date of Surgery: 19  Date of Referral to PT: 19  Referring Physician: Myron Easley MD    Admit Date: 2019    Visit Dx:    ICD-10-CM ICD-9-CM   1. Difficulty walking R26.2 719.7   2. Failed total knee arthroplasty (CMS/[facility]) T84.018A 996.47    Z96.659 V43.65     Patient Active Problem List   Diagnosis   • OA (osteoarthritis) of knee       Therapy Treatment    Rehabilitation Treatment Summary     Row Name 19 1356             Treatment Time/Intention    Discipline  physical therapist  -MA      Document Type  therapy note (daily note)  -MA      Subjective Information  complains of;pain  -MA      Mode of Treatment  physical therapy  -MA      Patient/Family Observations  UI upon gym arrival  -MA      Therapy Frequency (PT Clinical Impression)  2 times/day  -MA      Patient Effort  good  -MA      Existing Precautions/Restrictions  fall  -MA      Recorded by [MA] Thu Vazquez, PT 19 1358      Row Name 19 1356             Cognitive Assessment/Intervention- PT/OT    Orientation Status (Cognition)  oriented x 4  -MA      Follows Commands (Cognition)  WFL  -MA      Personal Safety Interventions  fall prevention program maintained;gait belt;nonskid shoes/slippers when out of bed  -MA      Recorded by [MA] Thu Vazquez, PT 19 1358      Row Name 19 1356             Mobility Assessment/Intervention    Extremity Weight-bearing Status  left lower extremity  -MA      Left Lower Extremity (Weight-bearing Status)  weight-bearing as tolerated (WBAT)  -MA      Recorded by [MA] Thu Vazquez, PT 19 1358      Row Name 19 1356             Bed Mobility Assessment/Treatment    Comment (Bed Mobility)  NT- UIC  -MA      Recorded by [MA] Thu Vazquez, PT 19 1358      Row Name  02/08/19 1356             Transfer Assessment/Treatment    Transfer Assessment/Treatment  sit-stand transfer;stand-sit transfer  -MA      Recorded by [MA] Thu Vazquez, PT 02/08/19 1358      Row Name 02/08/19 1356             Sit-Stand Transfer    Sit-Stand [place] (Transfers)  stand by assist;verbal cues  -MA      Assistive Device (Sit-Stand Transfers)  walker, front-wheeled  -MA      Recorded by [MA] Thu Vazquez, PT 02/08/19 1358      Row Name 02/08/19 1356             Stand-Sit Transfer    Stand-Sit [place] (Transfers)  supervision;verbal cues  -MA      Assistive Device (Stand-Sit Transfers)  walker, front-wheeled  -MA      Recorded by [MA] Thu Vazquez, PT 02/08/19 1358      Row Name 02/08/19 1356             Gait/Stairs Assessment/Training    Gait/Stairs Assessment/Training  gait/ambulation independence  -MA      [place] Level (Gait)  contact guard;verbal cues  -MA      Assistive Device (Gait)  walker, front-wheeled  -MA      Distance in Feet (Gait)  50  -MA      Pattern (Gait)  step-to  -MA      Deviations/Abnormal Patterns (Gait)  antalgic;zenobia decreased;gait speed decreased  -MA      Recorded by [MA] Thu Vazquez, PT 02/08/19 1358      Row Name 02/08/19 1356             Therapeutic Exercise    Comment (Therapeutic Exercise)  TKA protocol x 20 reps  -MA      Recorded by [MA] Thu Vazquez, PT 02/08/19 1358      Row Name 02/08/19 1356             Positioning and Restraints    Pre-Treatment Position  sitting in chair/recliner  -MA      Post Treatment Position  bed  -MA      In Bed  notified nsg;fowlers;call light within reach;encouraged to call for assist  -MA      In Chair  --  -MA      Recorded by [MA] Thu Vazquez, PT 02/08/19 1358      Row Name 02/08/19 1356             Pain Scale: Numbers Pre/Post-Treatment    Pain Location - Side  Left  -MA      Pain Location  knee  -MA      Pre/Post Treatment Pain Comment  10/10  -MA      Pain Intervention(s)   Cold applied;Repositioned;Ambulation/increased activity;Rest  -MA      Recorded by [MA] Thu Vazquez, PT 02/08/19 1358      Row Name 02/08/19 1356             Sensory Assessment/Intervention    Sensory General Assessment  no sensation deficits identified  -MA      Recorded by [MA] Thu Vazquez, PT 02/08/19 1358      Row Name                Wound 02/07/19 1454 Left leg incision    Wound - Properties Group Date first assessed: 02/07/19 [AS] Time first assessed: 1454 [AS] Side: Left [AS] Location: leg [AS] Type: incision [AS] Recorded by:  [AS] Eula Sharma RN 02/07/19 1454    Row Name 02/08/19 1356             Plan of Care Review    Plan of Care Reviewed With  patient;friend  -MA      Recorded by [MA] Thu Vazquez, PT 02/08/19 1358      Row Name 02/08/19 1356             Outcome Summary/Treatment Plan (PT)    Daily Summary of Progress (PT)  progress toward functional goals as expected  -MA      Anticipated Discharge Disposition (PT)  home with assist;home with home health  -MA      Recorded by [MA] Thu Vazquez, PT 02/08/19 1359        User Key  (r) = Recorded By, (t) = Taken By, (c) = Cosigned By    Initials Name Effective Dates Discipline    AS Eula Sharma, RN 06/16/16 -  Nurse    Thu Armendariz, PT 04/03/18 -  PT          Wound 02/07/19 1454 Left leg incision (Active)   Dressing Appearance dry;intact;no drainage 2/8/2019  8:11 AM   Closure AURELIA 2/8/2019  8:11 AM   Drainage Characteristics/Odor sanguineous 2/8/2019  5:14 AM   Drainage Amount none 2/8/2019  8:11 AM   Dressing Care, Wound border dressing 2/8/2019  8:11 AM       Rehab Goal Summary     Row Name 02/08/19 1003             Physical Therapy Goals    Bed Mobility Goal Selection (PT)  bed mobility, PT goal 1  -CA      Transfer Goal Selection (PT)  transfer, PT goal 1  -CA      Gait Training Goal Selection (PT)  gait training, PT goal 1  -CA      Stairs Goal Selection (PT)  stairs, PT goal 1  -CA         Bed  Mobility Goal 1 (PT)    Activity/Assistive Device (Bed Mobility Goal 1, PT)  bed mobility activities, all  -CA      [place] Level/Cues Needed (Bed Mobility Goal 1, PT)  supervision required  -CA      Time Frame (Bed Mobility Goal 1, PT)  1 week  -CA         Transfer Goal 1 (PT)    Activity/Assistive Device (Transfer Goal 1, PT)  transfers, all  -CA      [place] Level/Cues Needed (Transfer Goal 1, PT)  supervision required  -CA      Time Frame (Transfer Goal 1, PT)  1 week  -CA         Gait Training Goal 1 (PT)    Activity/Assistive Device (Gait Training Goal 1, PT)  gait (walking locomotion);assistive device use  -CA      [place] Level (Gait Training Goal 1, PT)  supervision required  -CA      Distance (Gait Goal 1, PT)  100  -CA      Time Frame (Gait Training Goal 1, PT)  1 week  -CA         Stairs Goal 1 (PT)    Activity/Assistive Device (Stairs Goal 1, PT)  stairs, all skills  -CA      [place] Level/Cues Needed (Stairs Goal 1, PT)  contact guard assist  -CA      Number of Stairs (Stairs Goal 1, PT)  1  -CA      Time Frame (Stairs Goal 1, PT)  1 week  -CA        User Key  (r) = Recorded By, (t) = Taken By, (c) = Cosigned By    Initials Name Provider Type Discipline    Taina Kaufman, PT Physical Therapist PT          Physical Therapy Education     Title: PT OT SLP Therapies (Done)     Topic: Physical Therapy (Done)     Point: Mobility training (Done)     Learning Progress Summary           Patient Acceptance, E, VU by CA at 2/8/2019 10:17 AM                   Point: Home exercise program (Done)     Learning Progress Summary           Patient Acceptance, E, VU by CA at 2/8/2019 10:17 AM                   Point: Body mechanics (Done)     Learning Progress Summary           Patient Acceptance, E, VU by CA at 2/8/2019 10:17 AM                   Point: Precautions (Done)     Learning Progress Summary           Patient Acceptance, E, VU by CA at 2/8/2019 10:17 AM                                User Key     Initials Effective Dates Name Provider Type Juan BLAIR 06/13/18 -  Taina Vyas, PT Physical Therapist PT                PT Recommendation and Plan  Anticipated Discharge Disposition (PT): home with assist, home with home health  Therapy Frequency (PT Clinical Impression): 2 times/day  Outcome Summary/Treatment Plan (PT)  Daily Summary of Progress (PT): progress toward functional goals as expected  Anticipated Discharge Disposition (PT): home with assist, home with home health  Plan of Care Reviewed With: patient, friend  Outcome Measures     Row Name 02/08/19 1000             How much help from another person do you currently need...    Turning from your back to your side while in flat bed without using bedrails?  3  -CA      Moving from lying on back to sitting on the side of a flat bed without bedrails?  3  -CA      Moving to and from a bed to a chair (including a wheelchair)?  3  -CA      Standing up from a chair using your arms (e.g., wheelchair, bedside chair)?  3  -CA      Climbing 3-5 steps with a railing?  3  -CA      To walk in hospital room?  3  -CA      AM-PAC 6 Clicks Score  18  -CA         Functional Assessment    Outcome Measure Options  AM-PAC 6 Clicks Basic Mobility (PT)  -CA        User Key  (r) = Recorded By, (t) = Taken By, (c) = Cosigned By    Initials Name Provider Type    Taina Kaufman, PT Physical Therapist         Time Calculation:   PT Charges     Row Name 02/08/19 1356 02/08/19 1019          Time Calculation    Start Time  1300  -MA  0849  -CA     Stop Time  1339  -MA  0912  -CA     Time Calculation (min)  39 min  -MA  23 min  -CA     PT Received On  02/08/19  -MA  02/08/19  -CA     PT - Next Appointment  02/09/19  -MA  02/08/19  -CA     PT Goal Re-Cert Due Date  --  02/15/19  -CA        Time Calculation- PT    Total Timed Code Minutes- PT  24 minute(s)  -MA  10 minute(s)  -CA       User Key  (r) = Recorded By, (t) = Taken By, (c) = Cosigned By    Initials Name  Provider Type    Thu Armendariz, PT Physical Therapist    Taina Kaufman, PT Physical Therapist        Therapy Suggested Charges     Code   Minutes Charges    None           Therapy Charges for Today     Code Description Service Date Service Provider Modifiers Qty    [id number]  PT THER PROC EA 15 MIN 2/8/2019 Thu Vazquez, PT GP 1    [id number] HC PT THER PROC GROUP 2/8/2019 Thu Vazquez, PT GP 1          PT G-Codes  Outcome Measure Options: AM-PAC 6 Clicks Basic Mobility (PT)  AM-PAC 6 Clicks Score: 18    Thu Vazquez PT  2/8/2019

## 2019-02-08 NOTE — PLAN OF CARE
Problem: Patient Care Overview  Goal: Plan of Care Review  Outcome: Ongoing (interventions implemented as appropriate)   02/08/19 0146   Coping/Psychosocial   Plan of Care Reviewed With patient   Plan of Care Review   Progress improving   OTHER   Outcome Summary POD1 LTKA. Vitals stable. Ambulating well with walker and standby assist. Voiding per BRP. Pt consistently reports pain 10/10, but appears to rest comfortably between staff visits, PO pain medication and IV toradol given for pain control. Hx htn, BP stable, continue to monitor. Plans to DC home with home health, possibly later today.     Goal: Individualization and Mutuality  Outcome: Ongoing (interventions implemented as appropriate)    Goal: Discharge Needs Assessment  Outcome: Ongoing (interventions implemented as appropriate)    Goal: Interprofessional Rounds/Family Conf  Outcome: Ongoing (interventions implemented as appropriate)      Problem: Fall Risk (Adult)  Goal: Identify Related Risk Factors and Signs and Symptoms  Outcome: Outcome(s) achieved Date Met: 02/08/19 02/08/19 0146   Fall Risk (Adult)   Related Risk Factors (Fall Risk) culprit medication(s);gait/mobility problems;environment unfamiliar   Signs and Symptoms (Fall Risk) presence of risk factors     Goal: Absence of Fall  Outcome: Ongoing (interventions implemented as appropriate)   02/08/19 0146   Fall Risk (Adult)   Absence of Fall achieves outcome       Problem: Knee Arthroplasty (Total, Partial) (Adult)  Goal: Signs and Symptoms of Listed Potential Problems Will be Absent, Minimized or Managed (Knee Arthroplasty)  Outcome: Ongoing (interventions implemented as appropriate)   02/08/19 0146   Goal/Outcome Evaluation   Problems Assessed (Knee Arthroplasty) all   Problems Present (Knee Arthroplasty) pain;range of motion decreased     Goal: Anesthesia/Sedation Recovery  Outcome: Ongoing (interventions implemented as appropriate)   02/08/19 0146   Goal/Outcome Evaluation    Anesthesia/Sedation Recovery progressing toward baseline

## 2019-02-08 NOTE — PROGRESS NOTES
"Ortho POD 1    Patients Name:  Holly Bateman  YOB: 1960  Medical Records Number:  [id number]    No complaints except pain    /72 (BP Location: Left arm, Patient Position: Lying)   Pulse 84   Temp 97.2 °F (36.2 °C) (Oral)   Resp 16   Ht 162.6 cm (64\")   Wt 77.2 kg (170 lb 3.1 oz)   SpO2 96%   BMI 29.21 kg/m²     LLE:  NVI, calf nontender, Sensation intact  No signs of DVT    Incision: clean, no infection    Lab Results (last 24 hours)     Procedure Component Value Units Date/Time    Wound Culture - Wound, Knee, Left [[id number]] Collected:  02/07/19 1339    Specimen:  Wound from Knee, Left Updated:  02/08/19 0829     Gram Stain No WBCs or organisms seen    Basic Metabolic Panel [[id number]]  (Abnormal) Collected:  02/08/19 0400    Specimen:  Blood Updated:  02/08/19 0445     Glucose 133 mg/dL      BUN 12 mg/dL      Creatinine 0.50 mg/dL      Sodium 138 mmol/L      Potassium 4.2 mmol/L      Chloride 99 mmol/L      CO2 25.8 mmol/L      Calcium 9.2 mg/dL      eGFR Non African Amer 128 mL/min/1.73      BUN/Creatinine Ratio 24.0     Anion Gap 13.2 mmol/L     Narrative:       GFR Normal >60  Chronic Kidney Disease <60  Kidney Failure <15    CBC (No Diff) [[id number]]  (Abnormal) Collected:  02/08/19 0400    Specimen:  Blood Updated:  02/08/19 0421     WBC 15.07 10*3/mm3      RBC 4.22 10*6/mm3      Hemoglobin 12.4 g/dL      Hematocrit 38.7 %      MCV 91.7 fL      MCH 29.4 pg      MCHC 32.0 g/dL      RDW 12.9 %      RDW-SD 43.2 fl      MPV 9.9 fL      Platelets 195 10*3/mm3     Anaerobic Culture - Wound, Knee, Left [[id number]] Collected:  02/07/19 1339    Specimen:  Wound from Knee, Left Updated:  02/07/19 1420          Xr Knee 1 Or 2 View Left    Result Date: 2/7/2019  Narrative: XR KNEE 1 OR 2 VW LEFT-  POSTOP PORTABLE 2 VIEWS LEFT KNEE  CLINICAL INFORMATION: Post arthroplasty  FINDINGS: Prosthesis is satisfactory in position. A complicating process is not identified.  This report was finalized " on 2/7/2019 4:46 PM by Dr. Adrien Ramirez M.D.      Xr Knee 1 Or 2 View Left    Result Date: 1/28/2019  Narrative: XR KNEE 1 OR 2 VW LEFT-  INDICATIONS:    Preoperative evaluation  TECHNIQUE: FRONTAL AND LATERAL VIEWS OF THE LEFT KNEE  COMPARISON: 3/14/2017  FINDINGS:   No acute fracture is identified. Left knee arthroplasty hardware is noted. Small lucency adjacent to the medial aspect of the tibial component, potentially evidence of loosening or infection, correlate clinically. No knee effusion.      Impression:  Knee arthroplasty hardware with small nonspecific lucency adjacent to the medial aspect of the tibial component. No acute fracture is identified.  This report was finalized on 1/28/2019 3:37 PM by Dr. Jerome Leong M.D.      Xr Chest Pa & Lateral    Result Date: 1/28/2019  Narrative: XR CHEST PA AND LATERAL-  HISTORY: Female who is 58 years-old,  preoperative evaluation  TECHNIQUE: Frontal and lateral views of the chest  COMPARISON: 3/8/2017  FINDINGS: Heart size is normal. Aorta is tortuous. Pulmonary vasculature is unremarkable. No focal pulmonary consolidation, pleural effusion, or pneumothorax. No acute osseous process.      Impression: No evidence for acute pulmonary process. Tortuous aorta. Follow-up as clinically indicated.  This report was finalized on 1/28/2019 1:27 PM by Dr. Jerome Leong M.D.        S/p Left TKA  WBAT with walker  ASA for DVT prophylaxis  Home with home health after PT tomorrow, if comfortable

## 2019-02-08 NOTE — PROGRESS NOTES
Continued Stay Note  [facility]     Patient Name: Holly Bateman  MRN: [id number]  Today's Date: 2/8/2019    Admit Date: 2/7/2019    Discharge Plan     Row Name 02/08/19 1353       Plan    Plan  [facility]    Patient/Family in Agreement with Plan  yes    Plan Comments  Spoke with pt, verified correct information on facesheet and explained the role of CCP. Pt would like to d/c home with [facility], referral given to Kiara with [facility] who states they are able to accept. Plan will be to d/c home with [facility] and family support.    Final Discharge Disposition Code  01 - home or self-care    Final Note  Pt to d/c home with [facility] (OP billing).        Discharge Codes    No documentation.       Expected Discharge Date and Time     Expected Discharge Date Expected Discharge Time    Feb 9, 2019             Gudelia Hale RN

## 2019-02-08 NOTE — THERAPY EVALUATION
Acute Care - Physical Therapy Initial Evaluation  [facility]     Patient Name: Holly Bateman  : 1960  MRN: [id number]  Today's Date: 2019   Onset of Illness/Injury or Date of Surgery: 19  Date of Referral to PT: 19  Referring Physician: Myron Easley MD      Admit Date: 2019    Visit Dx:     ICD-10-CM ICD-9-CM   1. Difficulty walking R26.2 719.7   2. Failed total knee arthroplasty (CMS/[facility]) T84.018A 996.47    Z96.659 V43.65     Patient Active Problem List   Diagnosis   • OA (osteoarthritis) of knee     Past Medical History:   Diagnosis Date   • Arthritis    • Hypertension    • Knee pain, bilateral    • Meniere disease      Past Surgical History:   Procedure Laterality Date   • BELPHAROPTOSIS REPAIR     • BILATERAL BREAST REDUCTION     • FOOT SURGERY Bilateral 2010    Plantar Fascitis   • HYSTERECTOMY     • KNEE SURGERY Bilateral     Torn meniscus   • LIFT / REPAIR BROW PTOSIS FOREHEAD     • SD TOTAL KNEE ARTHROPLASTY Left 3/14/2017    Procedure: LT TOTAL KNEE ARTHROPLASTY;  Surgeon: Myron Easley MD;  Location: [facility];  Service: Orthopedics   • TONSILLECTOMY     • TOTAL KNEE ARTHROPLASTY REVISION Left 2019    Procedure: LT TOTAL KNEE ARTHROPLASTY REVISION ALL COMPONENTS;  Surgeon: Myron Easley MD;  Location: [facility];  Service: Orthopedics   • TUBAL ABDOMINAL LIGATION          PT ASSESSMENT (last 12 hours)      Physical Therapy Evaluation     Row Name 19 1003          PT Evaluation Time/Intention    Subjective Information  complains of;pain  -CA     Document Type  evaluation  -CA     Mode of Treatment  individual therapy;physical therapy  -CA     Patient Effort  good  -CA     Symptoms Noted During/After Treatment  none  -CA     Row Name 19 1003          General Information    Patient Profile Reviewed?  yes  -CA     Onset of Illness/Injury or Date of Surgery  19  -CA     Referring Physician  Myron Easley MD  -CA     Patient  Observations  alert;cooperative;agree to therapy  -CA     Equipment Currently Used at Home  -- has FWW  -CA     Pertinent History of Current Functional Problem  L TKA revision  -CA     Existing Precautions/Restrictions  fall  -CA     Row Name 02/08/19 1003          Resource/Environmental Concerns    Current Living Arrangements  home/apartment/condo  -CA     Row Name 02/08/19 1003          Home Main Entrance    Number of Stairs, Main Entrance  one  -CA     Row Name 02/08/19 1003          Cognitive Assessment/Intervention- PT/OT    Orientation Status (Cognition)  oriented x 4  -CA     Follows Commands (Cognition)  WNL  -CA     Personal Safety Interventions  fall prevention program maintained;gait belt;nonskid shoes/slippers when out of bed  -CA     Row Name 02/08/19 1003          Mobility Assessment/Treatment    Extremity Weight-bearing Status  left lower extremity  -CA     Left Lower Extremity (Weight-bearing Status)  weight-bearing as tolerated (WBAT)  -CA     Row Name 02/08/19 1003          Bed Mobility Assessment/Treatment    Bed Mobility Assessment/Treatment  supine-sit;sit-supine  -CA     Supine-Sit [place] (Bed Mobility)  not tested  -CA     Sit-Supine [place] (Bed Mobility)  not tested  -CA     Row Name 02/08/19 1003          Transfer Assessment/Treatment    Transfer Assessment/Treatment  sit-stand transfer;stand-sit transfer  -CA     Sit-Stand [place] (Transfers)  contact guard  -CA     Stand-Sit [place] (Transfers)  contact guard  -CA     Row Name 02/08/19 1003          Sit-Stand Transfer    Assistive Device (Sit-Stand Transfers)  walker, front-wheeled  -CA     Row Name 02/08/19 1003          Stand-Sit Transfer    Assistive Device (Stand-Sit Transfers)  walker, front-wheeled  -CA     Row Name 02/08/19 1003          Gait/Stairs Assessment/Training    Gait/Stairs Assessment/Training  gait/ambulation independence  -CA     [place] Level (Gait)  contact guard  -CA     Assistive Device  "(Gait)  walker, front-wheeled  -CA     Distance in Feet (Gait)  30  -CA     Pattern (Gait)  step-to  -CA     Deviations/Abnormal Patterns (Gait)  antalgic;gait speed decreased;stride length decreased;zenobia decreased  -CA     Row Name 02/08/19 1003          General ROM    GENERAL ROM COMMENTS  grossly wfl, exception L knee approx 60 deg flex  -CA     Row Name 02/08/19 1003          MMT (Manual Muscle Testing)    General MMT Comments  grossly at least 3/5 based on functional mobility  -CA     Row Name 02/08/19 1003          Motor Assessment/Intervention    Additional Documentation  Therapeutic Exercise Interventions (Group)  -CA     Row Name 02/08/19 1003          Therapeutic Exercise    Comment (Therapeutic Exercise)  TKR protocol x 10 reps e.  -CA     Row Name 02/08/19 1003          Sensory Assessment/Intervention    Sensory General Assessment  no sensation deficits identified  -CA     Row Name 02/08/19 1003          Pain Assessment    Additional Documentation  Pain Scale: Numbers Pre/Post-Treatment (Group)  -[facility] Name 02/08/19 1003          Pain Scale: Numbers Pre/Post-Treatment    Pain Location - Side  Left  -CA     Pain Location  knee  -CA     Pre/Post Treatment Pain Comment  \"12/10\"  -CA     Pain Intervention(s)  Cold applied;Repositioned;Ambulation/increased activity;Cold pack  -CA     Row Name             Wound 02/07/19 1454 Left leg incision    Wound - Properties Group Date first assessed: 02/07/19  -AS Time first assessed: 1454  -AS Side: Left  -AS Location: leg  -AS Type: incision  -AS    Row Name 02/08/19 1003          Physical Therapy Clinical Impression    Date of Referral to PT  02/07/19  -CA     Criteria for Skilled Interventions Met (PT Clinical Impression)  yes;treatment indicated  -CA     Rehab Potential (PT Clinical Summary)  good, to achieve stated therapy goals  -CA     Row Name 02/08/19 1003          Physical Therapy Goals    Bed Mobility Goal Selection (PT)  bed mobility, PT goal 1  -CA "     Transfer Goal Selection (PT)  transfer, PT goal 1  -CA     Gait Training Goal Selection (PT)  gait training, PT goal 1  -CA     Stairs Goal Selection (PT)  stairs, PT goal 1  -CA     Additional Documentation  Stairs Goal Selection (PT) (Row)  -CA     Row Name 02/08/19 1003          Bed Mobility Goal 1 (PT)    Activity/Assistive Device (Bed Mobility Goal 1, PT)  bed mobility activities, all  -CA     [place] Level/Cues Needed (Bed Mobility Goal 1, PT)  supervision required  -CA     Time Frame (Bed Mobility Goal 1, PT)  1 week  -CA     Row Name 02/08/19 1003          Transfer Goal 1 (PT)    Activity/Assistive Device (Transfer Goal 1, PT)  transfers, all  -CA     [place] Level/Cues Needed (Transfer Goal 1, PT)  supervision required  -CA     Time Frame (Transfer Goal 1, PT)  1 week  -CA     Row Name 02/08/19 1003          Gait Training Goal 1 (PT)    Activity/Assistive Device (Gait Training Goal 1, PT)  gait (walking locomotion);assistive device use  -CA     [place] Level (Gait Training Goal 1, PT)  supervision required  -CA     Distance (Gait Goal 1, PT)  100  -CA     Time Frame (Gait Training Goal 1, PT)  1 week  -CA     Row Name 02/08/19 1003          Stairs Goal 1 (PT)    Activity/Assistive Device (Stairs Goal 1, PT)  stairs, all skills  -CA     [place] Level/Cues Needed (Stairs Goal 1, PT)  contact guard assist  -CA     Number of Stairs (Stairs Goal 1, PT)  1  -CA     Time Frame (Stairs Goal 1, PT)  1 week  -CA     Row Name 02/08/19 1003          Positioning and Restraints    Pre-Treatment Position  sitting in chair/recliner  -CA     Post Treatment Position  chair  -CA     In Chair  reclined;call light within reach;encouraged to call for assist  -CA     Row Name 02/08/19 1003          Living Environment    Home Accessibility  stairs to enter home  -CA       User Key  (r) = Recorded By, (t) = Taken By, (c) = Cosigned By    Initials Name Provider Type    AS Eula Sharma, RN Registered Nurse     Taina Kaufman, PT Physical Therapist        Physical Therapy Education     Title: PT OT SLP Therapies (Done)     Topic: Physical Therapy (Done)     Point: Mobility training (Done)     Learning Progress Summary           Patient Acceptance, E, VU by CA at 2/8/2019 10:17 AM                   Point: Home exercise program (Done)     Learning Progress Summary           Patient Acceptance, E, VU by CA at 2/8/2019 10:17 AM                   Point: Body mechanics (Done)     Learning Progress Summary           Patient Acceptance, E, VU by CA at 2/8/2019 10:17 AM                   Point: Precautions (Done)     Learning Progress Summary           Patient Acceptance, E, VU by CA at 2/8/2019 10:17 AM                               User Key     Initials Effective Dates Name Provider Type Discipline    CA 06/13/18 -  Taina Vyas, PT Physical Therapist PT              PT Recommendation and Plan  Anticipated Discharge Disposition (PT): home with assist, home with home health  Planned Therapy Interventions (PT Eval): balance training, bed mobility training, gait training, home exercise program, manual therapy techniques, neuromuscular re-education, patient/family education, ROM (range of motion), stair training, strengthening, stretching, transfer training  Therapy Frequency (PT Clinical Impression): 2 times/day  Outcome Summary/Treatment Plan (PT)  Anticipated Discharge Disposition (PT): home with assist, home with home health  Plan of Care Reviewed With: patient  Outcome Summary: Pt is a 58 y.o. female s/p L TKA on 2/7/2019 and is WBAT. Pt presents today with weakness, decreased ROM, and decreased functional mobility. Pt requires CGA for STS transfers, and CGA for amb with FWW for 30 ft. Pt will benefit from skilled PT to address the previous impairments and improve safety with functional mobility. Pt has FWW for home. Anticipate pt will D/C to home with assist and HH PT in 1-2 days.  Outcome Measures     Row Name  02/08/19 1000             How much help from another person do you currently need...    Turning from your back to your side while in flat bed without using bedrails?  3  -CA      Moving from lying on back to sitting on the side of a flat bed without bedrails?  3  -CA      Moving to and from a bed to a chair (including a wheelchair)?  3  -CA      Standing up from a chair using your arms (e.g., wheelchair, bedside chair)?  3  -CA      Climbing 3-5 steps with a railing?  3  -CA      To walk in hospital room?  3  -CA      AM-PAC 6 Clicks Score  18  -CA         Functional Assessment    Outcome Measure Options  AM-PAC 6 Clicks Basic Mobility (PT)  -CA        User Key  (r) = Recorded By, (t) = Taken By, (c) = Cosigned By    Initials Name Provider Type    Taina Kaufman, ANDREZ Physical Therapist         Time Calculation:   PT Charges     Row Name 02/08/19 1019             Time Calculation    Start Time  0849  -CA      Stop Time  0912  -CA      Time Calculation (min)  23 min  -CA      PT Received On  02/08/19  -CA      PT - Next Appointment  02/08/19  -CA      PT Goal Re-Cert Due Date  02/15/19  -CA         Time Calculation- PT    Total Timed Code Minutes- PT  10 minute(s)  -CA        User Key  (r) = Recorded By, (t) = Taken By, (c) = Cosigned By    Initials Name Provider Type    Taina Kaufman, ANDREZ Physical Therapist        Therapy Suggested Charges     Code   Minutes Charges    None           Therapy Charges for Today     Code Description Service Date Service Provider Modifiers Qty    32196807787 HC PT EVAL LOW COMPLEXITY 2 2/8/2019 Taina Vyas, PT GP 1    82511517987 HC PT THER PROC EA 15 MIN 2/8/2019 Taina Vyas, PT GP 1          PT G-Codes  Outcome Measure Options: AM-PAC 6 Clicks Basic Mobility (PT)  AM-PAC 6 Clicks Score: 18      Taina Vyas PT  2/8/2019

## 2019-02-09 VITALS
SYSTOLIC BLOOD PRESSURE: 115 MMHG | TEMPERATURE: 99.4 F | BODY MASS INDEX: 29.06 KG/M2 | OXYGEN SATURATION: 100 % | WEIGHT: 170.19 LBS | DIASTOLIC BLOOD PRESSURE: 70 MMHG | HEART RATE: 88 BPM | HEIGHT: 64 IN | RESPIRATION RATE: 16 BRPM

## 2019-02-09 LAB
DEPRECATED RDW RBC AUTO: 45 FL (ref 37–54)
ERYTHROCYTE [DISTWIDTH] IN BLOOD BY AUTOMATED COUNT: 13.3 % (ref 11.7–13)
HCT VFR BLD AUTO: 35.2 % (ref 35.6–45.5)
HGB BLD-MCNC: 11.3 G/DL (ref 11.9–15.5)
MCH RBC QN AUTO: 29.9 PG (ref 26.9–32)
MCHC RBC AUTO-ENTMCNC: 32.1 G/DL (ref 32.4–36.3)
MCV RBC AUTO: 93.1 FL (ref 80.5–98.2)
PLATELET # BLD AUTO: 169 10*3/MM3 (ref 140–500)
PMV BLD AUTO: 9.9 FL (ref 6–12)
RBC # BLD AUTO: 3.78 10*6/MM3 (ref 3.9–5.2)
WBC NRBC COR # BLD: 9.18 10*3/MM3 (ref 4.5–10.7)

## 2019-02-09 PROCEDURE — 85027 COMPLETE CBC AUTOMATED: CPT | Performed by: ORTHOPAEDIC SURGERY

## 2019-02-09 PROCEDURE — 97150 GROUP THERAPEUTIC PROCEDURES: CPT

## 2019-02-09 PROCEDURE — 97110 THERAPEUTIC EXERCISES: CPT

## 2019-02-09 PROCEDURE — 25010000002 KETOROLAC TROMETHAMINE PER 15 MG: Performed by: ORTHOPAEDIC SURGERY

## 2019-02-09 RX ADMIN — MUPIROCIN 10 APPLICATION: 20 OINTMENT TOPICAL at 08:15

## 2019-02-09 RX ADMIN — OXYCODONE HYDROCHLORIDE AND ACETAMINOPHEN 2 TABLET: 10; 325 TABLET ORAL at 03:36

## 2019-02-09 RX ADMIN — DOCUSATE SODIUM 100 MG: 100 CAPSULE, LIQUID FILLED ORAL at 08:15

## 2019-02-09 RX ADMIN — KETOROLAC TROMETHAMINE 15 MG: 30 INJECTION, SOLUTION INTRAMUSCULAR at 10:53

## 2019-02-09 RX ADMIN — HYDROCHLOROTHIAZIDE 12.5 MG: 12.5 CAPSULE ORAL at 08:15

## 2019-02-09 RX ADMIN — OXYCODONE HYDROCHLORIDE AND ACETAMINOPHEN 2 TABLET: 10; 325 TABLET ORAL at 07:28

## 2019-02-09 RX ADMIN — LOSARTAN POTASSIUM 50 MG: 50 TABLET, FILM COATED ORAL at 08:15

## 2019-02-09 RX ADMIN — OXYCODONE HYDROCHLORIDE AND ACETAMINOPHEN 2 TABLET: 10; 325 TABLET ORAL at 11:16

## 2019-02-09 RX ADMIN — ASPIRIN 325 MG: 325 TABLET, DELAYED RELEASE ORAL at 08:15

## 2019-02-09 NOTE — PLAN OF CARE
Problem: Patient Care Overview  Goal: Plan of Care Review  Outcome: Ongoing (interventions implemented as appropriate)   02/09/19 2394   Coping/Psychosocial   Plan of Care Reviewed With patient   Plan of Care Review   Progress improving   OTHER   Outcome Summary VSS, pain still elevated but tolerable, able to get sleep in between meds, up with assist of 1, voiding per BRP, RA, SL, educated on BP mnitoring, plans for home with HH today     Goal: Individualization and Mutuality  Outcome: Ongoing (interventions implemented as appropriate)    Goal: Discharge Needs Assessment  Outcome: Ongoing (interventions implemented as appropriate)      Problem: Fall Risk (Adult)  Goal: Absence of Fall  Outcome: Ongoing (interventions implemented as appropriate)      Problem: Knee Arthroplasty (Total, Partial) (Adult)  Goal: Signs and Symptoms of Listed Potential Problems Will be Absent, Minimized or Managed (Knee Arthroplasty)  Outcome: Ongoing (interventions implemented as appropriate)

## 2019-02-09 NOTE — PLAN OF CARE
Problem: Patient Care Overview  Goal: Plan of Care Review  Outcome: Outcome(s) achieved Date Met: 02/09/19 02/09/19 1237   Coping/Psychosocial   Plan of Care Reviewed With patient   Plan of Care Review   Progress improving   OTHER   Outcome Summary Patient ambulating using walker and stand by assist. Vitals are stable and voiding function is intact. Pain is managed with po meds. Patient educated on bp monitoring and med compliance. Patient is prepared and ready for d/c home with hh.        Problem: Fall Risk (Adult)  Goal: Absence of Fall  Outcome: Outcome(s) achieved Date Met: 02/09/19 02/09/19 1237   Fall Risk (Adult)   Absence of Fall achieves outcome       Problem: Knee Arthroplasty (Total, Partial) (Adult)  Goal: Signs and Symptoms of Listed Potential Problems Will be Absent, Minimized or Managed (Knee Arthroplasty)  Outcome: Outcome(s) achieved Date Met: 02/09/19 02/09/19 1237   Goal/Outcome Evaluation   Problems Assessed (Knee Arthroplasty) all   Problems Present (Knee Arthroplasty) pain;functional deficit;range of motion decreased

## 2019-02-09 NOTE — THERAPY DISCHARGE NOTE
Acute Care - Physical Therapy Treatment Note/Discharge  [facility]     Patient Name: Holly Bateman  : 1960  MRN: [id number]  Today's Date: 2019  Onset of Illness/Injury or Date of Surgery: 19  Date of Referral to PT: 19  Referring Physician: Myron Easley MD    Admit Date: 2019    Visit Dx:    ICD-10-CM ICD-9-CM   1. Difficulty walking R26.2 719.7   2. Failed total knee arthroplasty (CMS/[facility]) T84.018A 996.47    Z96.659 V43.65     Patient Active Problem List   Diagnosis   • OA (osteoarthritis) of knee       Physical Therapy Education     Title: PT OT SLP Therapies (Resolved)     Topic: Physical Therapy (Resolved)     Point: Mobility training (Resolved)     Learning Progress Summary           Patient Acceptance, E,TB,D, VU,DU by  at 2019 11:35 AM    Acceptance, E, VU by CA at 2019 10:17 AM                   Point: Home exercise program (Resolved)     Learning Progress Summary           Patient Acceptance, E,TB,D, VU,DU by EE at 2019 11:35 AM    Acceptance, E, VU by CA at 2019 10:17 AM                   Point: Body mechanics (Resolved)     Learning Progress Summary           Patient Acceptance, E,TB,D, VU,DU by EE at 2019 11:35 AM    Acceptance, E, VU by CA at 2019 10:17 AM                   Point: Precautions (Resolved)     Learning Progress Summary           Patient Acceptance, E,TB,D, VU,DU by  at 2019 11:35 AM    Acceptance, E, VU by CA at 2019 10:17 AM                               User Key     Initials Effective Dates Name Provider Type Discipline    EE 18 -  Maisha Mendes, PT Physical Therapist PT    CA 18 -  Taina Vyas, PT Physical Therapist PT              Rehab Goal Summary     Row Name 19 1100             Bed Mobility Goal 1 (PT)    Progress/Outcomes (Bed Mobility Goal 1, PT)  goal not met  -EE         Transfer Goal 1 (PT)    Progress/Outcome (Transfer Goal 1, PT)  goal partially met  -EE         Gait Training Goal  1 (PT)    Progress/Outcome (Gait Training Goal 1, PT)  goal partially met  -EE         Stairs Goal 1 (PT)    Progress/Outcome (Stairs Goal 1, PT)  goal met  -EE        User Key  (r) = Recorded By, (t) = Taken By, (c) = Cosigned By    Initials Name Provider Type Discipline    EE Maisha Mendes, PT Physical Therapist PT        Therapy Treatment  Rehabilitation Treatment Summary     Row Name 02/09/19 0837             Treatment Time/Intention    Discipline  physical therapist  -EE      Document Type  therapy note (daily note);discharge treatment  -EE      Subjective Information  complains of;pain  -EE      Mode of Treatment  physical therapy  -EE      Patient/Family Observations  Pt sitting up in [place] in gym in no acute distress  -EE2      Patient Effort  good  -EE2      Existing Precautions/Restrictions  fall  -EE2      Recorded by [EE] Maisha Mendes, PT 02/09/19 1129  [EE2] Maisha Mendes, PT 02/09/19 1135      Row Name 02/09/19 0837             Cognitive Assessment/Intervention- PT/OT    Orientation Status (Cognition)  oriented x 4  -EE      Follows Commands (Cognition)  WFL  -EE      Personal Safety Interventions  fall prevention program maintained;gait belt;muscle strengthening facilitated;nonskid shoes/slippers when out of bed;supervised activity  -EE      Recorded by [EE] Maisha Mendes, PT 02/09/19 1135      Row Name 02/09/19 0837             Sit-Stand Transfer    Sit-Stand [place] (Transfers)  stand by assist;verbal cues  -EE      Assistive Device (Sit-Stand Transfers)  walker, front-wheeled  -EE      Recorded by [EE] Maisha Mendes, PT 02/09/19 1135      Row Name 02/09/19 0837             Stand-Sit Transfer    Stand-Sit [place] (Transfers)  supervision;verbal cues  -EE      Assistive Device (Stand-Sit Transfers)  walker, front-wheeled  -EE      Recorded by [EE] Maisha Mendes, PT 02/09/19 1135      Row Name 02/09/19 0837             Gait/Stairs Assessment/Training    Franklin Level (Gait)  stand by  assist;verbal cues  -EE      Assistive Device (Gait)  walker, front-wheeled  -EE      Distance in Feet (Gait)  75  -EE      Pattern (Gait)  step-to  -EE      Deviations/Abnormal Patterns (Gait)  antalgic;zenobia decreased;gait speed decreased  -EE      Left Sided Gait Deviations  heel strike decreased  -EE      Negotiation (Stairs)  stairs independence  -EE2      [place] Level (Stairs)  contact guard  -EE2      Handrail Location (Stairs)  right side (ascending)  -EE2      Number of Steps (Stairs)  4  -EE2      Ascending Technique (Stairs)  step-to-step  -EE2      Descending Technique (Stairs)  step-to-step  -EE2      Recorded by [EE] Maisha Mendes, PT 02/09/19 1135  [EE2] Maisha Mendes, PT 02/09/19 1137      Row Name 02/09/19 0837             General ROM    GENERAL ROM COMMENTS  L knee AAROM 10-83  -EE      Recorded by [EE] Maisha Mendes, PT 02/09/19 1135      Row Name 02/09/19 0837             Therapeutic Exercise    Comment (Therapeutic Exercise)  TKA protocol x 20 reps  -EE      Recorded by [EE] Maisha Mendes, PT 02/09/19 1135      Row Name 02/09/19 0837             Positioning and Restraints    Pre-Treatment Position  sitting in chair/recliner  -EE      Post Treatment Position  chair  -EE      In Chair  reclined;call light within reach;encouraged to call for assist;legs elevated  -EE      Recorded by [EE] Maisha Mendes, PT 02/09/19 1135      Row Name 02/09/19 0837             Pain Scale: Numbers Pre/Post-Treatment    Pain Location - Side  Left  -EE      Pain Location  knee  -EE      Pre/Post Treatment Pain Comment  reports 9/10 pain; elevated and ice applied following tx  -EE      Pain Intervention(s)  Repositioned;Cold applied;Elevated  -EE      Recorded by [EE] Maisha Mendes, PT 02/09/19 1135      Row Name                Wound 02/07/19 1454 Left leg incision    Wound - Properties Group Date first assessed: 02/07/19 [AS] Time first assessed: 1454 [AS] Side: Left [AS] Location: leg [AS] Type: incision [AS]  Recorded by:  [AS] Eula Sharma, RN 02/07/19 7465      User Key  (r) = Recorded By, (t) = Taken By, (c) = Cosigned By    Initials Name Effective Dates Discipline    Maisha Dick, PT 04/03/18 -  PT    AS Eula Sharma RN 06/16/16 -  Nurse        Wound 02/07/19 1454 Left leg incision (Active)   Dressing Appearance no drainage;dry;intact 2/9/2019  8:15 AM   Closure AURELIA 2/9/2019  8:15 AM   Base dressing in place, unable to visualize 2/9/2019  8:15 AM   Drainage Amount none 2/9/2019  8:15 AM   Dressing Care, Wound border dressing 2/9/2019  8:15 AM       PT Recommendation and Plan     Plan of Care Reviewed With: patient  Progress: improving  Outcome Summary: Pt demonstrates steady progress with mobility. Able to increase ambulation distance and demonstrating improved L knee ROM. Pt planning to DC home today and continue w/HH PT; no problems anticipated.     Outcome Measures     Row Name 02/09/19 1100 02/08/19 1000          How much help from another person do you currently need...    Turning from your back to your side while in flat bed without using bedrails?  4  -EE  3  -CA     Moving from lying on back to sitting on the side of a flat bed without bedrails?  4  -EE  3  -CA     Moving to and from a bed to a chair (including a wheelchair)?  3  -EE  3  -CA     Standing up from a chair using your arms (e.g., wheelchair, bedside chair)?  3  -EE  3  -CA     Climbing 3-5 steps with a railing?  3  -EE  3  -CA     To walk in hospital room?  3  -EE  3  -CA     AM-PAC 6 Clicks Score  20  -EE  18  -CA        Functional Assessment    Outcome Measure Options  AM-PAC 6 Clicks Basic Mobility (PT)  -EE  AM-PAC 6 Clicks Basic Mobility (PT)  -CA       User Key  (r) = Recorded By, (t) = Taken By, (c) = Cosigned By    Initials Name Provider Type    Maisha Dick, PT Physical Therapist    Taina Kaufman, PT Physical Therapist           Time Calculation:   PT Charges     Row Name 02/09/19 8987             Time Calculation     Start Time  0837  -EE      Stop Time  0928  -EE      Time Calculation (min)  51 min  -EE      PT Received On  02/09/19  -EE        User Key  (r) = Recorded By, (t) = Taken By, (c) = Cosigned By    Initials Name Provider Type    EE Maisha Mendes, PT Physical Therapist        Therapy Suggested Charges     Code   Minutes Charges    None             Therapy Charges for Today     Code Description Service Date Service Provider Modifiers Qty    25669985537 HC PT THER PROC EA 15 MIN 2/9/2019 Maisha Mendes, PT GP 1    72404764756 HC PT THER PROC GROUP 2/9/2019 Maisha Mendes, PT GP 1          PT G-Codes  Outcome Measure Options: AM-PAC 6 Clicks Basic Mobility (PT)  AM-PAC 6 Clicks Score: 20    PT Discharge Summary  Reason for Discharge: Discharge from facility  Outcomes Achieved: Patient able to partially acheive established goals  Discharge Destination: Home with assist, Home with home health    Maisha Mendes, PT  2/9/2019

## 2019-02-09 NOTE — PLAN OF CARE
Problem: Patient Care Overview  Goal: Plan of Care Review  Outcome: Ongoing (interventions implemented as appropriate)   02/08/19 1851   Coping/Psychosocial   Plan of Care Reviewed With patient   Plan of Care Review   Progress improving   OTHER   Outcome Summary Patient is ambulating using walker and stand by assist. Vitals are stable and voiding function is intact. Pain is slightly better controlled with increased percocets, x1 dose of IV toradol given. Patient educated on bp monitoring and med compliance. Patient anticipates d/c home with  tomorrow.        Problem: Fall Risk (Adult)  Goal: Absence of Fall  Outcome: Ongoing (interventions implemented as appropriate)   02/08/19 1850   Fall Risk (Adult)   Absence of Fall achieves outcome       Problem: Knee Arthroplasty (Total, Partial) (Adult)  Goal: Signs and Symptoms of Listed Potential Problems Will be Absent, Minimized or Managed (Knee Arthroplasty)  Outcome: Ongoing (interventions implemented as appropriate)   02/08/19 1850   Goal/Outcome Evaluation   Problems Present (Knee Arthroplasty) pain;functional deficit;range of motion decreased     Goal: Anesthesia/Sedation Recovery  Outcome: Outcome(s) achieved Date Met: 02/08/19 02/08/19 1820   Goal/Outcome Evaluation   Anesthesia/Sedation Recovery recovered to baseline

## 2019-02-09 NOTE — PLAN OF CARE
Problem: Patient Care Overview  Goal: Plan of Care Review   02/09/19 1135   Coping/Psychosocial   Plan of Care Reviewed With patient   Plan of Care Review   Progress improving   OTHER   Outcome Summary Pt demonstrates steady progress with mobility. Able to increase ambulation distance and demonstrating improved L knee ROM. Pt planning to DC home today and continue w/HH PT; no problems anticipated.

## 2019-02-10 LAB
BACTERIA SPEC AEROBE CULT: NORMAL
GRAM STN SPEC: NORMAL

## 2019-02-12 LAB — BACTERIA SPEC ANAEROBE CULT: NORMAL

## 2022-01-24 ENCOUNTER — LAB (OUTPATIENT)
Dept: LAB | Facility: HOSPITAL | Age: 62
End: 2022-01-24

## 2022-01-24 ENCOUNTER — TRANSCRIBE ORDERS (OUTPATIENT)
Dept: ADMINISTRATIVE | Facility: HOSPITAL | Age: 62
End: 2022-01-24

## 2022-01-24 DIAGNOSIS — E07.0 HYPERSECRETION OF CALCITONIN: ICD-10-CM

## 2022-01-24 DIAGNOSIS — C73 THYROID CANCER: ICD-10-CM

## 2022-01-24 DIAGNOSIS — C73 THYROID CANCER: Primary | ICD-10-CM

## 2022-01-24 PROCEDURE — 82308 ASSAY OF CALCITONIN: CPT

## 2022-01-24 PROCEDURE — 36415 COLL VENOUS BLD VENIPUNCTURE: CPT

## 2022-01-26 LAB — CALCIT SERPL-MCNC: 530 PG/ML (ref 0–5)

## 2022-06-13 ENCOUNTER — TRANSCRIBE ORDERS (OUTPATIENT)
Dept: ADMINISTRATIVE | Facility: HOSPITAL | Age: 62
End: 2022-06-13

## 2022-06-13 DIAGNOSIS — C73 MALIGNANT NEOPLASM OF THYROID GLAND: Primary | ICD-10-CM

## 2022-07-25 ENCOUNTER — HOSPITAL ENCOUNTER (OUTPATIENT)
Dept: CT IMAGING | Facility: HOSPITAL | Age: 62
Discharge: HOME | End: 2022-07-25
Admitting: INTERNAL MEDICINE

## 2022-07-25 DIAGNOSIS — C73 MALIGNANT NEOPLASM OF THYROID GLAND: ICD-10-CM

## 2022-07-25 LAB — CREAT BLDA-MCNC: 0.6 MG/DL (ref 0.6–1.3)

## 2022-07-25 PROCEDURE — 25010000002 IOPAMIDOL 61 % SOLUTION: Performed by: INTERNAL MEDICINE

## 2022-07-25 PROCEDURE — 70491 CT SOFT TISSUE NECK W/DYE: CPT

## 2022-07-25 PROCEDURE — 82565 ASSAY OF CREATININE: CPT

## 2022-07-25 RX ADMIN — IOPAMIDOL 75 ML: 612 INJECTION, SOLUTION INTRAVENOUS at 07:16

## 2022-09-12 ENCOUNTER — HOSPITAL ENCOUNTER (OUTPATIENT)
Dept: ULTRASOUND IMAGING | Facility: HOSPITAL | Age: 62
Discharge: HOME | End: 2022-09-12
Admitting: INTERNAL MEDICINE

## 2022-09-12 DIAGNOSIS — C73 MALIGNANT NEOPLASM OF THYROID GLAND: ICD-10-CM

## 2022-09-12 PROCEDURE — 76999 ECHO EXAMINATION PROCEDURE: CPT

## 2023-01-13 ENCOUNTER — LAB (OUTPATIENT)
Dept: LAB | Facility: HOSPITAL | Age: 63
End: 2023-01-13
Payer: COMMERCIAL

## 2023-01-13 ENCOUNTER — TRANSCRIBE ORDERS (OUTPATIENT)
Dept: ADMINISTRATIVE | Facility: HOSPITAL | Age: 63
End: 2023-01-13
Payer: COMMERCIAL

## 2023-01-13 DIAGNOSIS — C73 MALIGNANT NEOPLASM OF THYROID GLAND: Primary | ICD-10-CM

## 2023-01-13 DIAGNOSIS — C73 MALIGNANT NEOPLASM OF THYROID GLAND: ICD-10-CM

## 2023-01-13 LAB
T4 FREE SERPL-MCNC: 1.75 NG/DL (ref 0.93–1.7)
TSH SERPL DL<=0.05 MIU/L-ACNC: 1.15 UIU/ML (ref 0.27–4.2)

## 2023-01-13 PROCEDURE — 84439 ASSAY OF FREE THYROXINE: CPT

## 2023-01-13 PROCEDURE — 36415 COLL VENOUS BLD VENIPUNCTURE: CPT

## 2023-01-13 PROCEDURE — 84443 ASSAY THYROID STIM HORMONE: CPT

## 2024-11-11 ENCOUNTER — TRANSCRIBE ORDERS (OUTPATIENT)
Dept: LAB | Facility: HOSPITAL | Age: 64
End: 2024-11-11
Payer: COMMERCIAL

## 2024-11-11 ENCOUNTER — TRANSCRIBE ORDERS (OUTPATIENT)
Dept: ADMINISTRATIVE | Facility: HOSPITAL | Age: 64
End: 2024-11-11
Payer: COMMERCIAL

## 2024-11-11 ENCOUNTER — LAB (OUTPATIENT)
Dept: LAB | Facility: HOSPITAL | Age: 64
End: 2024-11-11
Payer: COMMERCIAL

## 2024-11-11 DIAGNOSIS — G89.28 CHRONIC POSTOPERATIVE PAIN: Primary | ICD-10-CM

## 2024-11-11 DIAGNOSIS — Z79.891 ENCOUNTER FOR LONG-TERM METHADONE USE: ICD-10-CM

## 2024-11-11 DIAGNOSIS — M17.11 ARTHRITIS OF RIGHT KNEE: ICD-10-CM

## 2024-11-11 DIAGNOSIS — G89.28 CHRONIC POSTOPERATIVE PAIN: ICD-10-CM

## 2024-11-11 PROCEDURE — 36415 COLL VENOUS BLD VENIPUNCTURE: CPT

## 2024-11-11 PROCEDURE — 80307 DRUG TEST PRSMV CHEM ANLYZR: CPT

## 2024-11-18 LAB
AMPHETAMINES BLD QL SCN: NEGATIVE NG/ML
BARBITURATES SERPLBLD QL: NEGATIVE UG/ML
BENZODIAZ BLD QL: NEGATIVE NG/ML
CANNABINOIDS BLD QL SCN: NEGATIVE NG/ML
COCAINE+BZE SERPLBLD QL SCN: NEGATIVE NG/ML
FENTANYL BLD QL SCN: NEGATIVE NG/ML
MEPERIDINE SERPLBLD QL SCN: NEGATIVE NG/ML
METHADONE BLD QL SCN: NEGATIVE NG/ML
OPIATES BLD QL SCN: NEGATIVE NG/ML
OXYCODONE BLD QL SCN: ABNORMAL NG/ML
OXYCODONE SERPLBLD CFM-MCNC: 6.4 NG/ML
OXYCODONE+OXYMORPHONE SERPLBLD CFM-IMP: POSITIVE
OXYMORPHONE SERPLBLD CFM-MCNC: NEGATIVE NG/ML
PCP BLD QL SCN: NEGATIVE NG/ML
PROPOXYPH BLD QL SCN: NEGATIVE NG/ML
TRAMADOL BLD QL SCN: NEGATIVE NG/ML

## 2025-02-10 ENCOUNTER — LAB (OUTPATIENT)
Dept: LAB | Facility: HOSPITAL | Age: 65
End: 2025-02-10
Payer: COMMERCIAL

## 2025-02-10 ENCOUNTER — TRANSCRIBE ORDERS (OUTPATIENT)
Dept: ADMINISTRATIVE | Facility: HOSPITAL | Age: 65
End: 2025-02-10
Payer: COMMERCIAL

## 2025-02-10 DIAGNOSIS — M17.11 ARTHRITIS OF RIGHT KNEE: ICD-10-CM

## 2025-02-10 DIAGNOSIS — G89.28 CHRONIC POSTOPERATIVE PAIN: ICD-10-CM

## 2025-02-10 DIAGNOSIS — G89.28 CHRONIC POSTOPERATIVE PAIN: Primary | ICD-10-CM

## 2025-02-10 DIAGNOSIS — Z79.891 ENCOUNTER FOR LONG-TERM METHADONE USE: ICD-10-CM

## 2025-02-10 PROCEDURE — 36415 COLL VENOUS BLD VENIPUNCTURE: CPT

## 2025-02-10 PROCEDURE — 80307 DRUG TEST PRSMV CHEM ANLYZR: CPT

## 2025-02-10 PROCEDURE — G0480 DRUG TEST DEF 1-7 CLASSES: HCPCS

## 2025-02-15 LAB
AMPHETAMINES SERPL QL SCN: NEGATIVE NG/ML
BARBITURATES SERPL QL SCN: NEGATIVE UG/ML
BENZODIAZ SERPL QL SCN: NEGATIVE NG/ML
CANNABINOIDS SERPL QL SCN: NEGATIVE NG/ML
COCAINE+BZE SERPL QL SCN: NEGATIVE NG/ML
ETHANOL SERPL-MCNC: NEGATIVE GM/DL
METHADONE SERPL QL SCN: NEGATIVE NG/ML
OPIATES SERPL QL SCN: NEGATIVE NG/ML
OXYCODONE SERPLBLD CFM-MCNC: 2.9 NG/ML
OXYCODONE+OXYMORPHONE SERPLBLD CFM-IMP: POSITIVE
OXYCODONE+OXYMORPHONE SERPLBLD QL SCN: ABNORMAL NG/ML
OXYMORPHONE SERPLBLD CFM-MCNC: NEGATIVE NG/ML
PCP SERPL QL SCN: NEGATIVE NG/ML
PROPOXYPH SERPL QL SCN: NEGATIVE NG/ML

## 2025-05-23 ENCOUNTER — TRANSCRIBE ORDERS (OUTPATIENT)
Dept: ADMINISTRATIVE | Facility: HOSPITAL | Age: 65
End: 2025-05-23
Payer: COMMERCIAL

## 2025-05-23 ENCOUNTER — LAB (OUTPATIENT)
Dept: LAB | Facility: HOSPITAL | Age: 65
End: 2025-05-23
Payer: COMMERCIAL

## 2025-05-23 DIAGNOSIS — M17.11 ARTHRITIS OF RIGHT KNEE: ICD-10-CM

## 2025-05-23 DIAGNOSIS — Z79.891 ENCOUNTER FOR LONG-TERM METHADONE USE: ICD-10-CM

## 2025-05-23 DIAGNOSIS — G89.28 CHRONIC POSTOPERATIVE PAIN: Primary | ICD-10-CM

## 2025-05-23 DIAGNOSIS — G89.28 CHRONIC POSTOPERATIVE PAIN: ICD-10-CM

## 2025-05-23 PROCEDURE — 36415 COLL VENOUS BLD VENIPUNCTURE: CPT

## 2025-05-23 PROCEDURE — 80307 DRUG TEST PRSMV CHEM ANLYZR: CPT

## 2025-06-05 LAB
AMPHETAMINES SERPL QL SCN: NEGATIVE NG/ML
BARBITURATES SERPL QL SCN: NEGATIVE UG/ML
BENZODIAZ SERPL QL SCN: NEGATIVE NG/ML
CANNABINOIDS SERPL QL SCN: NEGATIVE NG/ML
COCAINE+BZE SERPL QL SCN: NEGATIVE NG/ML
ETHANOL SERPL-MCNC: NEGATIVE GM/DL
METHADONE SERPL QL SCN: NEGATIVE NG/ML
OPIATES SERPL QL SCN: NEGATIVE NG/ML
OXYCODONE SERPLBLD CFM-MCNC: 1.4 NG/ML
OXYCODONE+OXYMORPHONE SERPLBLD CFM-IMP: POSITIVE
OXYCODONE+OXYMORPHONE SERPLBLD QL SCN: ABNORMAL NG/ML
OXYMORPHONE SERPLBLD CFM-MCNC: NEGATIVE NG/ML
PCP SERPL QL SCN: NEGATIVE NG/ML
PROPOXYPH SERPL QL SCN: NEGATIVE NG/ML

## (undated) DEVICE — SPNG GZ WOVN 4X4IN 12PLY 10/BX STRL

## (undated) DEVICE — OCCLUSIVE GAUZE STRIP,3% BISMUTH TRIBROMOPHENATE IN PETROLATUM BLEND: Brand: XEROFORM

## (undated) DEVICE — NDL SPINE 20G 3 1/2 YEL STRL 1P/U

## (undated) DEVICE — PK KN TOTL 40

## (undated) DEVICE — PAD,ABDOMINAL,8"X10",ST,LF: Brand: MEDLINE

## (undated) DEVICE — MAT FLR ABSORBENT LG 4FT 10 2.5FT

## (undated) DEVICE — PIN DRL NOHEAD TROC 3.2X75MM BX/4

## (undated) DEVICE — TOWEL,OR,DSP,ST,BLUE,STD,4/PK,20PK/CS: Brand: MEDLINE

## (undated) DEVICE — KT DRN EVAC WND PVC PCH WTROC RND 10F400

## (undated) DEVICE — DRSNG GZ PETROLTM XEROFORM CURAD 1X8IN STRL

## (undated) DEVICE — ENCORE® LATEX ORTHO SIZE 7.5, STERILE LATEX POWDER-FREE SURGICAL GLOVE: Brand: ENCORE

## (undated) DEVICE — ANTIBACTERIAL UNDYED BRAIDED (POLYGLACTIN 910), SYNTHETIC ABSORBABLE SUTURE: Brand: COATED VICRYL

## (undated) DEVICE — APPL CHLORAPREP W/TINT 26ML ORNG

## (undated) DEVICE — PIN TROC SIGNATURE PK/2

## (undated) DEVICE — STPLR SKIN VISISTAT WD 35CT

## (undated) DEVICE — BNDG ELAS ELITE V/CLOSE 6IN 5YD LF STRL

## (undated) DEVICE — DRAPE,REIN 53X77,STERILE: Brand: MEDLINE

## (undated) DEVICE — SPNG LAP 18X18IN LF STRL PK/5

## (undated) DEVICE — UNDERCAST PADDING: Brand: DEROYAL

## (undated) DEVICE — DECANT BG O JET

## (undated) DEVICE — THIN OSTEOTOME BLADE 8MM X 3 IN: Brand: RENOVATION

## (undated) DEVICE — COAXIAL HIGH FLOW TIP WITH SOFT SHIELD

## (undated) DEVICE — GLV SURG BIOGEL LTX PF 7 1/2

## (undated) DEVICE — RECIPROCATING BLADE, DOUBLE SIDED, OFFSET  (70.0 X 0.64 X 12.6MM)

## (undated) DEVICE — DUAL CUT SAGITTAL BLADE

## (undated) DEVICE — BNDG ELAS ELITE V/CLOSE 4IN 5YD LF STRL

## (undated) DEVICE — CULT AER/ANAEROB FASTIDIOUS BACT